# Patient Record
Sex: FEMALE | Race: WHITE | NOT HISPANIC OR LATINO | Employment: UNEMPLOYED | URBAN - METROPOLITAN AREA
[De-identification: names, ages, dates, MRNs, and addresses within clinical notes are randomized per-mention and may not be internally consistent; named-entity substitution may affect disease eponyms.]

---

## 2019-12-18 RX ORDER — MONTELUKAST SODIUM 10 MG/1
TABLET ORAL
Qty: 90 TABLET | Refills: 3 | OUTPATIENT
Start: 2019-12-18

## 2019-12-20 RX ORDER — MONTELUKAST SODIUM 10 MG/1
TABLET ORAL
Qty: 90 TABLET | Refills: 3 | Status: SHIPPED | OUTPATIENT
Start: 2019-12-20 | End: 2020-01-10 | Stop reason: SDUPTHER

## 2020-01-10 ENCOUNTER — OFFICE VISIT (OUTPATIENT)
Dept: FAMILY MEDICINE CLINIC | Facility: CLINIC | Age: 40
End: 2020-01-10

## 2020-01-10 VITALS
HEART RATE: 89 BPM | WEIGHT: 293 LBS | BODY MASS INDEX: 41.95 KG/M2 | OXYGEN SATURATION: 97 % | SYSTOLIC BLOOD PRESSURE: 124 MMHG | DIASTOLIC BLOOD PRESSURE: 80 MMHG | TEMPERATURE: 97 F | HEIGHT: 70 IN

## 2020-01-10 DIAGNOSIS — Z00.00 HEALTHCARE MAINTENANCE: Primary | ICD-10-CM

## 2020-01-10 DIAGNOSIS — M25.50 ARTHRALGIA, UNSPECIFIED JOINT: ICD-10-CM

## 2020-01-10 DIAGNOSIS — R53.83 FATIGUE, UNSPECIFIED TYPE: ICD-10-CM

## 2020-01-10 DIAGNOSIS — J45.909 MILD ASTHMA, UNSPECIFIED WHETHER COMPLICATED, UNSPECIFIED WHETHER PERSISTENT: ICD-10-CM

## 2020-01-10 PROCEDURE — 99386 PREV VISIT NEW AGE 40-64: CPT | Performed by: FAMILY MEDICINE

## 2020-01-10 RX ORDER — ALBUTEROL SULFATE 90 UG/1
AEROSOL, METERED RESPIRATORY (INHALATION)
COMMUNITY
Start: 2019-01-28 | End: 2020-01-10 | Stop reason: SDUPTHER

## 2020-01-10 RX ORDER — MONTELUKAST SODIUM 10 MG/1
10 TABLET ORAL DAILY
Qty: 90 TABLET | Refills: 3 | Status: SHIPPED | OUTPATIENT
Start: 2020-01-10 | End: 2020-07-14 | Stop reason: SDUPTHER

## 2020-01-10 RX ORDER — CETIRIZINE HYDROCHLORIDE 10 MG/1
TABLET ORAL
COMMUNITY
Start: 2019-02-20 | End: 2020-07-14 | Stop reason: SDUPTHER

## 2020-01-10 RX ORDER — ALBUTEROL SULFATE 90 UG/1
2 AEROSOL, METERED RESPIRATORY (INHALATION) EVERY 4 HOURS PRN
Qty: 3 INHALER | Refills: 3 | Status: SHIPPED | OUTPATIENT
Start: 2020-01-10 | End: 2021-01-26

## 2020-01-10 NOTE — PROGRESS NOTES
Rudi Adams is a 39 y.o. female.     Chief Complaint   Patient presents with   • Annual Exam     physical   • Asthma       History of Present Illness       The following portions of the patient's history were reviewed and updated as appropriate: allergies, current medications, past family history, past medical history, past social history, past surgical history and problem list.    Past Medical History:   Diagnosis Date   • Abnormal mammogram    • Acute upper respiratory infection    • Anemia    • Anxiety    • Arthritis    • Asthma    • BMI 40.0-44.9, adult (CMS/[facility])    • Breast mass, right    • Cervical radiculopathy    • Cutaneous candidiasis    • Eczema    • Fatigue    • Former smoker, stopped smoking in distant past    • Increased urinary frequency    • Mass of multiple sites of left breast    • Multiple joint pain    • Myalgia    • Myositis    • Rash    • Sleep apnea    • Trochanteric bursitis of right hip    • UTI symptoms    • Vitamin D deficiency        Past Surgical History:   Procedure Laterality Date   • GASTRIC BYPASS      for morbid obesity        Family History   Problem Relation Age of Onset   • Anemia Mother    • Arthritis Mother    • Breast cancer Mother    • Osteoporosis Mother    • Alcohol abuse Father    • Hyperlipidemia Father    • Hypertension Father    • Diabetes Father    • Bipolar disorder Sister    • Seizures Sister    • Alcohol abuse Maternal Grandmother    • Arthritis Maternal Grandmother    • Osteoporosis Maternal Grandmother    • Diabetes Paternal Grandmother        Social History     Socioeconomic History   • Marital status:      Spouse name: Not on file   • Number of children: Not on file   • Years of education: Not on file   • Highest education level: Not on file   Tobacco Use   • Smoking status: Former Smoker   • Smokeless tobacco: Never Used   Substance and Sexual Activity   • Alcohol use: Never     Frequency: Never   • Drug use: Never   • Sexual activity:  "Yes       Current Outpatient Medications on File Prior to Visit   Medication Sig Dispense Refill   • cetirizine (ZYRTEC ALLERGY) 10 MG tablet      • [DISCONTINUED] albuterol sulfate HFA (PROAIR HFA) 108 (90 Base) MCG/ACT inhaler      • [DISCONTINUED] montelukast (SINGULAIR) 10 MG tablet TAKE 1 TABLET BY MOUTH EVERY DAY 90 tablet 3     No current facility-administered medications on file prior to visit.        Review of Systems   Respiratory: Negative.    Cardiovascular: Negative.    Endocrine: Negative.    Genitourinary: Positive for urinary incontinence.   Musculoskeletal: Positive for arthralgias.   Allergic/Immunologic: Negative.    Neurological: Negative.    Hematological: Negative.    Psychiatric/Behavioral: Negative.        No results found for this or any previous visit (from the past 4704 hour(s)).  Objective   Vitals:    01/10/20 0906   BP: 124/80   Pulse: 89   Temp: 97 °F (36.1 °C)   SpO2: 97%   Weight: 135 kg (297 lb)   Height: 177.8 cm (70\")     Body mass index is 42.62 kg/m².  Physical Exam      Assessment/Plan   Aroldo was seen today for annual exam and asthma.    Diagnoses and all orders for this visit:    Healthcare maintenance  -     Tdap (ADACEL) 5-2-15.5 LF-MCG/0.5 injection; Inject 0.5 mL into the appropriate muscle as directed by prescriber 1 (One) Time for 1 dose.  -     Comprehensive Metabolic Panel  -     Lipid Panel  -     CBC & Differential    Mild asthma, unspecified whether complicated, unspecified whether persistent  -     montelukast (SINGULAIR) 10 MG tablet; Take 1 tablet by mouth Daily.  -     albuterol sulfate HFA (PROAIR HFA) 108 (90 Base) MCG/ACT inhaler; Inhale 2 puffs Every 4 (Four) Hours As Needed for Wheezing or Shortness of Air.    Fatigue, unspecified type  -     TSH Rfx On Abnormal To Free T4    BMI 40.0-44.9, adult (CMS/HCC)    Arthralgia, unspecified joint  -     KIKE With / DsDNA, RNP, Sjogrens A / B, Smith  -     CK  -     C-reactive Protein  -     HLA-B27 Antigen  -    "  Rheumatoid Factor  -     Sedimentation Rate  -     Uric Acid      .Return in about 1 year (around 1/10/2021) for Annual physical.

## 2020-01-15 LAB
ALBUMIN SERPL-MCNC: 4.3 G/DL (ref 3.5–5.5)
ALBUMIN/GLOB SERPL: 1.5 {RATIO} (ref 1.2–2.2)
ALP SERPL-CCNC: 53 IU/L (ref 39–117)
ALT SERPL-CCNC: 11 IU/L (ref 0–32)
ANA SER QL: NEGATIVE
AST SERPL-CCNC: 15 IU/L (ref 0–40)
BASOPHILS # BLD AUTO: 0 X10E3/UL (ref 0–0.2)
BASOPHILS NFR BLD AUTO: 1 %
BILIRUB SERPL-MCNC: 0.3 MG/DL (ref 0–1.2)
BUN SERPL-MCNC: 11 MG/DL (ref 6–24)
BUN/CREAT SERPL: 14 (ref 9–23)
CALCIUM SERPL-MCNC: 9.2 MG/DL (ref 8.7–10.2)
CHLORIDE SERPL-SCNC: 103 MMOL/L (ref 96–106)
CHOLEST SERPL-MCNC: 190 MG/DL (ref 100–199)
CK SERPL-CCNC: 145 U/L (ref 24–173)
CO2 SERPL-SCNC: 22 MMOL/L (ref 20–29)
CREAT SERPL-MCNC: 0.81 MG/DL (ref 0.57–1)
CRP SERPL-MCNC: 2 MG/L (ref 0–10)
EOSINOPHIL # BLD AUTO: 0.2 X10E3/UL (ref 0–0.4)
EOSINOPHIL NFR BLD AUTO: 3 %
ERYTHROCYTE [DISTWIDTH] IN BLOOD BY AUTOMATED COUNT: 14.6 % (ref 11.7–15.4)
ERYTHROCYTE [SEDIMENTATION RATE] IN BLOOD BY WESTERGREN METHOD: 16 MM/HR (ref 0–32)
GLOBULIN SER CALC-MCNC: 2.9 G/DL (ref 1.5–4.5)
GLUCOSE SERPL-MCNC: 90 MG/DL (ref 65–99)
HCT VFR BLD AUTO: 38.1 % (ref 34–46.6)
HDLC SERPL-MCNC: 62 MG/DL
HGB BLD-MCNC: 11.9 G/DL (ref 11.1–15.9)
HLA-B27 QL NAA+PROBE: NEGATIVE
IMM GRANULOCYTES # BLD AUTO: 0 X10E3/UL (ref 0–0.1)
IMM GRANULOCYTES NFR BLD AUTO: 0 %
LDLC SERPL CALC-MCNC: 112 MG/DL (ref 0–99)
LYMPHOCYTES # BLD AUTO: 2 X10E3/UL (ref 0.7–3.1)
LYMPHOCYTES NFR BLD AUTO: 30 %
MCH RBC QN AUTO: 25.8 PG (ref 26.6–33)
MCHC RBC AUTO-ENTMCNC: 31.2 G/DL (ref 31.5–35.7)
MCV RBC AUTO: 83 FL (ref 79–97)
MONOCYTES # BLD AUTO: 0.4 X10E3/UL (ref 0.1–0.9)
MONOCYTES NFR BLD AUTO: 6 %
NEUTROPHILS # BLD AUTO: 4 X10E3/UL (ref 1.4–7)
NEUTROPHILS NFR BLD AUTO: 60 %
PLATELET # BLD AUTO: 411 X10E3/UL (ref 150–450)
POTASSIUM SERPL-SCNC: 5 MMOL/L (ref 3.5–5.2)
PROT SERPL-MCNC: 7.2 G/DL (ref 6–8.5)
RBC # BLD AUTO: 4.62 X10E6/UL (ref 3.77–5.28)
RHEUMATOID FACT SERPL-ACNC: 10.4 IU/ML (ref 0–13.9)
SODIUM SERPL-SCNC: 142 MMOL/L (ref 134–144)
TRIGL SERPL-MCNC: 78 MG/DL (ref 0–149)
TSH SERPL DL<=0.005 MIU/L-ACNC: 2.35 UIU/ML (ref 0.45–4.5)
URATE SERPL-MCNC: 3.8 MG/DL (ref 2.5–7.1)
VLDLC SERPL CALC-MCNC: 16 MG/DL (ref 5–40)
WBC # BLD AUTO: 6.7 X10E3/UL (ref 3.4–10.8)

## 2020-01-24 ENCOUNTER — OFFICE VISIT (OUTPATIENT)
Dept: RETAIL CLINIC | Facility: CLINIC | Age: 40
End: 2020-01-24

## 2020-01-24 VITALS
SYSTOLIC BLOOD PRESSURE: 120 MMHG | OXYGEN SATURATION: 94 % | TEMPERATURE: 98.5 F | HEART RATE: 99 BPM | DIASTOLIC BLOOD PRESSURE: 85 MMHG

## 2020-01-24 DIAGNOSIS — J40 BRONCHITIS: Primary | ICD-10-CM

## 2020-01-24 PROCEDURE — 99213 OFFICE O/P EST LOW 20 MIN: CPT | Performed by: NURSE PRACTITIONER

## 2020-01-24 RX ORDER — METHYLPREDNISOLONE 4 MG/1
TABLET ORAL
Qty: 21 TABLET | Refills: 0 | Status: SHIPPED | OUTPATIENT
Start: 2020-01-24 | End: 2020-07-14 | Stop reason: SDUPTHER

## 2020-01-24 RX ORDER — AZITHROMYCIN 250 MG/1
TABLET, FILM COATED ORAL
Qty: 6 TABLET | Refills: 0 | Status: SHIPPED | OUTPATIENT
Start: 2020-01-24 | End: 2020-07-14 | Stop reason: SDUPTHER

## 2020-01-24 NOTE — PROGRESS NOTES
Subjective   Aroldo Adams is a 40 y.o. female.     Cough   This is a new problem. The current episode started in the past 7 days (4 days). The problem has been gradually worsening. The problem occurs every few minutes. Cough characteristics: intermittently productive of yellow sputum. Associated symptoms include postnasal drip, a sore throat, shortness of breath and wheezing (mild; intermittent). Pertinent negatives include no chills, fever, myalgias or rhinorrhea. She has tried steroid inhaler (sudafed) for the symptoms. Her past medical history is significant for asthma and bronchitis. There is no history of COPD, environmental allergies or pneumonia.        The following portions of the patient's history were reviewed and updated as appropriate: allergies, current medications, past family history, past medical history, past social history, past surgical history and problem list.    Review of Systems   Constitutional: Positive for fatigue. Negative for chills and fever.   HENT: Positive for postnasal drip and sore throat. Negative for congestion, rhinorrhea, sinus pressure and sneezing.    Respiratory: Positive for cough, chest tightness, shortness of breath and wheezing (mild; intermittent).    Cardiovascular: Negative.    Gastrointestinal: Negative.    Musculoskeletal: Negative for myalgias.   Allergic/Immunologic: Negative for environmental allergies.       Objective   Physical Exam   Constitutional: She is oriented to person, place, and time. She appears well-developed.   HENT:   Head: Normocephalic.   Right Ear: Ear canal normal. A middle ear effusion is present.   Left Ear: Ear canal normal. A middle ear effusion is present.   Nose: Nose normal.   Mouth/Throat: Uvula is midline, oropharynx is clear and moist and mucous membranes are normal. No tonsillar exudate.   Cardiovascular: Normal rate, regular rhythm and normal heart sounds.   Pulmonary/Chest: Effort normal. She has wheezes in the left upper  field. She has rhonchi in the right middle field.   Lymphadenopathy:     She has no cervical adenopathy.   Neurological: She is alert and oriented to person, place, and time.   Skin: Skin is warm, dry and intact.   Psychiatric: She has a normal mood and affect.     Vitals:    01/24/20 1738   BP: 120/85   BP Location: Left arm   Patient Position: Sitting   Cuff Size: Adult   Pulse: 99   Temp: 98.5 °F (36.9 °C)   TempSrc: Oral   SpO2: 94%         Assessment/Plan   Aroldo was seen today for cough and uri.    Diagnoses and all orders for this visit:    Bronchitis  -     azithromycin (ZITHROMAX Z-JAZZY) 250 MG tablet; Take 2 tablets the first day, then 1 tablet daily for 4 days.  -     methylPREDNISolone (MEDROL, JAZZY,) 4 MG tablet; Take as directed on package instructions.    Pt to follow up with PCP if symptoms worsen or fail to improve as anticipated.      Acute Bronchitis, Adult    Acute bronchitis is sudden (acute) swelling of the air tubes (bronchi) in the lungs. Acute bronchitis causes these tubes to fill with mucus, which can make it hard to breathe. It can also cause coughing or wheezing.  In adults, acute bronchitis usually goes away within 2 weeks. A cough caused by bronchitis may last up to 3 weeks. Smoking, allergies, and asthma can make the condition worse. Repeated episodes of bronchitis may cause further lung problems, such as chronic obstructive pulmonary disease (COPD).  What are the causes?  This condition can be caused by germs and by substances that irritate the lungs, including:  · Cold and flu viruses. This condition is most often caused by the same virus that causes a cold.  · Bacteria.  · Exposure to tobacco smoke, dust, fumes, and air pollution.  What increases the risk?  This condition is more likely to develop in people who:  · Have close contact with someone with acute bronchitis.  · Are exposed to lung irritants, such as tobacco smoke, dust, fumes, and vapors.  · Have a weak immune  system.  · Have a respiratory condition such as asthma.  What are the signs or symptoms?  Symptoms of this condition include:  · A cough.  · Coughing up clear, yellow, or green mucus.  · Wheezing.  · Chest congestion.  · Shortness of breath.  · A fever.  · Body aches.  · Chills.  · A sore throat.  How is this diagnosed?  This condition is usually diagnosed with a physical exam. During the exam, your health care provider may order tests, such as chest X-rays, to rule out other conditions. He or she may also:  · Test a sample of your mucus for bacterial infection.  · Check the level of oxygen in your blood. This is done to check for pneumonia.  · Do a chest X-ray or lung function testing to rule out pneumonia and other conditions.  · Perform blood tests.  Your health care provider will also ask about your symptoms and medical history.  How is this treated?  Most cases of acute bronchitis clear up over time without treatment. Your health care provider may recommend:  · Drinking more fluids. Drinking more makes your mucus thinner, which may make it easier to breathe.  · Taking a medicine for a fever or cough.  · Taking an antibiotic medicine.  · Using an inhaler to help improve shortness of breath and to control a cough.  · Using a cool mist vaporizer or humidifier to make it easier to breathe.  Follow these instructions at home:  Medicines  · Take over-the-counter and prescription medicines only as told by your health care provider.  · If you were prescribed an antibiotic, take it as told by your health care provider. Do not stop taking the antibiotic even if you start to feel better.  General instructions    · Get plenty of rest.  · Drink enough fluids to keep your urine pale yellow.  · Avoid smoking and secondhand smoke. Exposure to cigarette smoke or irritating chemicals will make bronchitis worse. If you smoke and you need help quitting, ask your health care provider. Quitting smoking will help your lungs heal  faster.  · Use an inhaler, cool mist vaporizer, or humidifier as told by your health care provider.  · Keep all follow-up visits as told by your health care provider. This is important.  How is this prevented?  To lower your risk of getting this condition again:  · Wash your hands often with soap and water. If soap and water are not available, use hand .  · Avoid contact with people who have cold symptoms.  · Try not to touch your hands to your mouth, nose, or eyes.  · Make sure to get the flu shot every year.  Contact a health care provider if:  · Your symptoms do not improve in 2 weeks of treatment.  Get help right away if:  · You cough up blood.  · You have chest pain.  · You have severe shortness of breath.  · You become dehydrated.  · You faint or keep feeling like you are going to faint.  · You keep vomiting.  · You have a severe headache.  · Your fever or chills gets worse.  This information is not intended to replace advice given to you by your health care provider. Make sure you discuss any questions you have with your health care provider.  Document Released: 01/25/2006 Document Revised: 08/01/2018 Document Reviewed: 06/07/2017  [employer] Interactive Patient Education © 2019 [employer]

## 2020-07-14 ENCOUNTER — OFFICE VISIT (OUTPATIENT)
Dept: FAMILY MEDICINE CLINIC | Facility: CLINIC | Age: 40
End: 2020-07-14

## 2020-07-14 DIAGNOSIS — J45.909 MILD ASTHMA, UNSPECIFIED WHETHER COMPLICATED, UNSPECIFIED WHETHER PERSISTENT: ICD-10-CM

## 2020-07-14 DIAGNOSIS — J40 BRONCHITIS: ICD-10-CM

## 2020-07-14 DIAGNOSIS — Z20.822 CLOSE EXPOSURE TO COVID-19 VIRUS: Primary | ICD-10-CM

## 2020-07-14 PROCEDURE — 99441 PR PHYS/QHP TELEPHONE EVALUATION 5-10 MIN: CPT | Performed by: FAMILY MEDICINE

## 2020-07-14 RX ORDER — CETIRIZINE HYDROCHLORIDE 10 MG/1
10 TABLET ORAL DAILY
Qty: 30 TABLET | Refills: 11 | Status: SHIPPED | OUTPATIENT
Start: 2020-07-14 | End: 2022-12-15

## 2020-07-14 RX ORDER — AZITHROMYCIN 250 MG/1
TABLET, FILM COATED ORAL
Qty: 6 TABLET | Refills: 0 | Status: SHIPPED | OUTPATIENT
Start: 2020-07-14 | End: 2021-01-29

## 2020-07-14 RX ORDER — METHYLPREDNISOLONE 4 MG/1
TABLET ORAL
Qty: 21 TABLET | Refills: 0 | Status: SHIPPED | OUTPATIENT
Start: 2020-07-14 | End: 2021-01-29

## 2020-07-14 RX ORDER — MONTELUKAST SODIUM 10 MG/1
10 TABLET ORAL DAILY
Qty: 90 TABLET | Refills: 3 | Status: SHIPPED | OUTPATIENT
Start: 2020-07-14 | End: 2021-01-29 | Stop reason: SDUPTHER

## 2020-07-14 NOTE — PROGRESS NOTES
Rudi Adams is a 40 y.o. female.     Consent given    Telephone visit    Time 10 min    Cc: exposed to Covid 19    History of Present Illness     Pt was exposed to Covid but rapid test was negative, she is waiting for PCR test now. Has some cough and wheezing but no fever.    The following portions of the patient's history were reviewed and updated as appropriate: allergies, current medications, past family history, past medical history, past social history, past surgical history and problem list.    Past Medical History:   Diagnosis Date   • Abnormal mammogram    • Acute upper respiratory infection    • Anemia    • Anxiety    • Arthritis    • Asthma    • BMI 40.0-44.9, adult (CMS/[facility])    • Breast mass, right    • Cervical radiculopathy    • Cutaneous candidiasis    • Eczema    • Fatigue    • Former smoker, stopped smoking in distant past    • Increased urinary frequency    • Mass of multiple sites of left breast    • Multiple joint pain    • Myalgia    • Myositis    • Rash    • Sleep apnea    • Trochanteric bursitis of right hip    • UTI symptoms    • Vitamin D deficiency        Past Surgical History:   Procedure Laterality Date   • GASTRIC BYPASS      for morbid obesity        Family History   Problem Relation Age of Onset   • Anemia Mother    • Arthritis Mother    • Breast cancer Mother    • Osteoporosis Mother    • Alcohol abuse Father    • Hyperlipidemia Father    • Hypertension Father    • Diabetes Father    • Bipolar disorder Sister    • Seizures Sister    • Alcohol abuse Maternal Grandmother    • Arthritis Maternal Grandmother    • Osteoporosis Maternal Grandmother    • Diabetes Paternal Grandmother        Social History     Socioeconomic History   • Marital status:      Spouse name: Not on file   • Number of children: Not on file   • Years of education: Not on file   • Highest education level: Not on file   Tobacco Use   • Smoking status: Former Smoker   • Smokeless tobacco: Never  Used   Substance and Sexual Activity   • Alcohol use: Never     Frequency: Never   • Drug use: Never   • Sexual activity: Yes       Current Outpatient Medications on File Prior to Visit   Medication Sig Dispense Refill   • albuterol sulfate HFA (PROAIR HFA) 108 (90 Base) MCG/ACT inhaler Inhale 2 puffs Every 4 (Four) Hours As Needed for Wheezing or Shortness of Air. 3 inhaler 3   • [DISCONTINUED] azithromycin (ZITHROMAX Z-JAZZY) 250 MG tablet Take 2 tablets the first day, then 1 tablet daily for 4 days. 6 tablet 0   • [DISCONTINUED] cetirizine (ZYRTEC ALLERGY) 10 MG tablet      • [DISCONTINUED] methylPREDNISolone (MEDROL, JAZZY,) 4 MG tablet Take as directed on package instructions. 21 tablet 0   • [DISCONTINUED] montelukast (SINGULAIR) 10 MG tablet Take 1 tablet by mouth Daily. 90 tablet 3     No current facility-administered medications on file prior to visit.        Review of Systems   Constitutional: Positive for fatigue. Negative for fever.   Respiratory: Positive for cough and wheezing.        Recent Results (from the past 4704 hour(s))   Comprehensive Metabolic Panel    Collection Time: 01/10/20  9:43 AM   Result Value Ref Range    Glucose 90 65 - 99 mg/dL    BUN 11 6 - 24 mg/dL    Creatinine 0.81 0.57 - 1.00 mg/dL    eGFR Non African Am 90 >59 mL/min/1.73    eGFR African Am 104 >59 mL/min/1.73    BUN/Creatinine Ratio 14 9 - 23    Sodium 142 134 - 144 mmol/L    Potassium 5.0 3.5 - 5.2 mmol/L    Chloride 103 96 - 106 mmol/L    Total CO2 22 20 - 29 mmol/L    Calcium 9.2 8.7 - 10.2 mg/dL    Total Protein 7.2 6.0 - 8.5 g/dL    Albumin 4.3 3.5 - 5.5 g/dL    Globulin 2.9 1.5 - 4.5 g/dL    A/G Ratio 1.5 1.2 - 2.2    Total Bilirubin 0.3 0.0 - 1.2 mg/dL    Alkaline Phosphatase 53 39 - 117 IU/L    AST (SGOT) 15 0 - 40 IU/L    ALT (SGPT) 11 0 - 32 IU/L   Lipid Panel    Collection Time: 01/10/20  9:43 AM   Result Value Ref Range    Total Cholesterol 190 100 - 199 mg/dL    Triglycerides 78 0 - 149 mg/dL    HDL Cholesterol 62  >39 mg/dL    VLDL Cholesterol 16 5 - 40 mg/dL    LDL Cholesterol  112 (H) 0 - 99 mg/dL   CBC & Differential    Collection Time: 01/10/20  9:43 AM   Result Value Ref Range    WBC 6.7 3.4 - 10.8 x10E3/uL    RBC 4.62 3.77 - 5.28 x10E6/uL    Hemoglobin 11.9 11.1 - 15.9 g/dL    Hematocrit 38.1 34.0 - 46.6 %    MCV 83 79 - 97 fL    MCH 25.8 (L) 26.6 - 33.0 pg    MCHC 31.2 (L) 31.5 - 35.7 g/dL    RDW 14.6 11.7 - 15.4 %    Platelets 411 150 - 450 x10E3/uL    Neutrophil Rel % 60 Not Estab. %    Lymphocyte Rel % 30 Not Estab. %    Monocyte Rel % 6 Not Estab. %    Eosinophil Rel % 3 Not Estab. %    Basophil Rel % 1 Not Estab. %    Neutrophils Absolute 4.0 1.4 - 7.0 x10E3/uL    Lymphocytes Absolute 2.0 0.7 - 3.1 x10E3/uL    Monocytes Absolute 0.4 0.1 - 0.9 x10E3/uL    Eosinophils Absolute 0.2 0.0 - 0.4 x10E3/uL    Basophils Absolute 0.0 0.0 - 0.2 x10E3/uL    Immature Granulocyte Rel % 0 Not Estab. %    Immature Grans Absolute 0.0 0.0 - 0.1 x10E3/uL   TSH Rfx On Abnormal To Free T4    Collection Time: 01/10/20  9:43 AM   Result Value Ref Range    TSH 2.350 0.450 - 4.500 uIU/mL   KIKE With / DsDNA, RNP, Sjogrens A / B, Green    Collection Time: 01/10/20  9:43 AM   Result Value Ref Range    KIKE Direct Negative Negative   CK    Collection Time: 01/10/20  9:43 AM   Result Value Ref Range    Creatine Kinase 145 24 - 173 U/L   C-reactive Protein    Collection Time: 01/10/20  9:43 AM   Result Value Ref Range    C-Reactive Protein 2 0 - 10 mg/L   HLA-B27 Antigen    Collection Time: 01/10/20  9:43 AM   Result Value Ref Range    HLA B27 Negative    Rheumatoid Factor    Collection Time: 01/10/20  9:43 AM   Result Value Ref Range    RA Latex Turbid 10.4 0.0 - 13.9 IU/mL   Sedimentation Rate    Collection Time: 01/10/20  9:43 AM   Result Value Ref Range    Sed Rate 16 0 - 32 mm/hr   Uric Acid    Collection Time: 01/10/20  9:43 AM   Result Value Ref Range    Uric Acid 3.8 2.5 - 7.1 mg/dL     Objective   There were no vitals filed for this  visit.  There is no height or weight on file to calculate BMI.  Physical Exam      Assessment/Plan   Diagnoses and all orders for this visit:    Close exposure to COVID-19 virus    Bronchitis  -     azithromycin (Zithromax Z-Jazzy) 250 MG tablet; Take 2 tablets the first day, then 1 tablet daily for 4 days.  -     methylPREDNISolone (MEDROL, JAZZY,) 4 MG tablet; Take as directed on package instructions.    Mild asthma, unspecified whether complicated, unspecified whether persistent  -     montelukast (SINGULAIR) 10 MG tablet; Take 1 tablet by mouth Daily.  -     cetirizine (ZyrTEC Allergy) 10 MG tablet; Take 1 tablet by mouth Daily.      Follow up prn.

## 2020-07-21 ENCOUNTER — TELEMEDICINE (OUTPATIENT)
Dept: FAMILY MEDICINE CLINIC | Facility: CLINIC | Age: 40
End: 2020-07-21

## 2020-07-21 ENCOUNTER — TELEPHONE (OUTPATIENT)
Dept: FAMILY MEDICINE CLINIC | Facility: CLINIC | Age: 40
End: 2020-07-21

## 2020-07-21 DIAGNOSIS — U07.1 COVID-19 VIRUS DETECTED: Primary | ICD-10-CM

## 2020-07-21 PROCEDURE — 99213 OFFICE O/P EST LOW 20 MIN: CPT | Performed by: FAMILY MEDICINE

## 2020-07-21 NOTE — TELEPHONE ENCOUNTER
Patient tested positive for covid on 07/13.  She said Dr Peña mentioned to call and let her know she is doing.  She says she is feeling fine and is having no symptoms now.  She also wants to know how long does she need to quarantine.  She said she heard the CDC had changed guidelines so she wasn't sure.  Her number is [phone].

## 2020-07-21 NOTE — PROGRESS NOTES
Rudi Adams is a 40 y.o. female.     Consent given    Visit via Doximity    Time 15 min    CC: follow up on Covid 19, doing better, no fever,    History of Present Illness    follow up on Covid 19, doing better, no fever,    The following portions of the patient's history were reviewed and updated as appropriate: allergies, current medications, past family history, past medical history, past social history, past surgical history and problem list.    Past Medical History:   Diagnosis Date   • Abnormal mammogram    • Acute upper respiratory infection    • Anemia    • Anxiety    • Arthritis    • Asthma    • BMI 40.0-44.9, adult (CMS/[facility])    • Breast mass, right    • Cervical radiculopathy    • Cutaneous candidiasis    • Eczema    • Fatigue    • Former smoker, stopped smoking in distant past    • Increased urinary frequency    • Mass of multiple sites of left breast    • Multiple joint pain    • Myalgia    • Myositis    • Rash    • Sleep apnea    • Trochanteric bursitis of right hip    • UTI symptoms    • Vitamin D deficiency        Past Surgical History:   Procedure Laterality Date   • GASTRIC BYPASS      for morbid obesity        Family History   Problem Relation Age of Onset   • Anemia Mother    • Arthritis Mother    • Breast cancer Mother    • Osteoporosis Mother    • Alcohol abuse Father    • Hyperlipidemia Father    • Hypertension Father    • Diabetes Father    • Bipolar disorder Sister    • Seizures Sister    • Alcohol abuse Maternal Grandmother    • Arthritis Maternal Grandmother    • Osteoporosis Maternal Grandmother    • Diabetes Paternal Grandmother        Social History     Socioeconomic History   • Marital status:      Spouse name: Not on file   • Number of children: Not on file   • Years of education: Not on file   • Highest education level: Not on file   Tobacco Use   • Smoking status: Former Smoker   • Smokeless tobacco: Never Used   Substance and Sexual Activity   • Alcohol  use: Never     Frequency: Never   • Drug use: Never   • Sexual activity: Yes       Current Outpatient Medications on File Prior to Visit   Medication Sig Dispense Refill   • albuterol sulfate HFA (PROAIR HFA) 108 (90 Base) MCG/ACT inhaler Inhale 2 puffs Every 4 (Four) Hours As Needed for Wheezing or Shortness of Air. 3 inhaler 3   • azithromycin (Zithromax Z-Jazzy) 250 MG tablet Take 2 tablets the first day, then 1 tablet daily for 4 days. 6 tablet 0   • cetirizine (ZyrTEC Allergy) 10 MG tablet Take 1 tablet by mouth Daily. 30 tablet 11   • methylPREDNISolone (MEDROL, JAZZY,) 4 MG tablet Take as directed on package instructions. 21 tablet 0   • montelukast (SINGULAIR) 10 MG tablet Take 1 tablet by mouth Daily. 90 tablet 3     No current facility-administered medications on file prior to visit.        Review of Systems   Constitutional: Negative.        Recent Results (from the past 4704 hour(s))   Comprehensive Metabolic Panel    Collection Time: 01/10/20  9:43 AM   Result Value Ref Range    Glucose 90 65 - 99 mg/dL    BUN 11 6 - 24 mg/dL    Creatinine 0.81 0.57 - 1.00 mg/dL    eGFR Non African Am 90 >59 mL/min/1.73    eGFR African Am 104 >59 mL/min/1.73    BUN/Creatinine Ratio 14 9 - 23    Sodium 142 134 - 144 mmol/L    Potassium 5.0 3.5 - 5.2 mmol/L    Chloride 103 96 - 106 mmol/L    Total CO2 22 20 - 29 mmol/L    Calcium 9.2 8.7 - 10.2 mg/dL    Total Protein 7.2 6.0 - 8.5 g/dL    Albumin 4.3 3.5 - 5.5 g/dL    Globulin 2.9 1.5 - 4.5 g/dL    A/G Ratio 1.5 1.2 - 2.2    Total Bilirubin 0.3 0.0 - 1.2 mg/dL    Alkaline Phosphatase 53 39 - 117 IU/L    AST (SGOT) 15 0 - 40 IU/L    ALT (SGPT) 11 0 - 32 IU/L   Lipid Panel    Collection Time: 01/10/20  9:43 AM   Result Value Ref Range    Total Cholesterol 190 100 - 199 mg/dL    Triglycerides 78 0 - 149 mg/dL    HDL Cholesterol 62 >39 mg/dL    VLDL Cholesterol 16 5 - 40 mg/dL    LDL Cholesterol  112 (H) 0 - 99 mg/dL   CBC & Differential    Collection Time: 01/10/20  9:43 AM    Result Value Ref Range    WBC 6.7 3.4 - 10.8 x10E3/uL    RBC 4.62 3.77 - 5.28 x10E6/uL    Hemoglobin 11.9 11.1 - 15.9 g/dL    Hematocrit 38.1 34.0 - 46.6 %    MCV 83 79 - 97 fL    MCH 25.8 (L) 26.6 - 33.0 pg    MCHC 31.2 (L) 31.5 - 35.7 g/dL    RDW 14.6 11.7 - 15.4 %    Platelets 411 150 - 450 x10E3/uL    Neutrophil Rel % 60 Not Estab. %    Lymphocyte Rel % 30 Not Estab. %    Monocyte Rel % 6 Not Estab. %    Eosinophil Rel % 3 Not Estab. %    Basophil Rel % 1 Not Estab. %    Neutrophils Absolute 4.0 1.4 - 7.0 x10E3/uL    Lymphocytes Absolute 2.0 0.7 - 3.1 x10E3/uL    Monocytes Absolute 0.4 0.1 - 0.9 x10E3/uL    Eosinophils Absolute 0.2 0.0 - 0.4 x10E3/uL    Basophils Absolute 0.0 0.0 - 0.2 x10E3/uL    Immature Granulocyte Rel % 0 Not Estab. %    Immature Grans Absolute 0.0 0.0 - 0.1 x10E3/uL   TSH Rfx On Abnormal To Free T4    Collection Time: 01/10/20  9:43 AM   Result Value Ref Range    TSH 2.350 0.450 - 4.500 uIU/mL   KIKE With / DsDNA, RNP, Sjogrens A / B, Green    Collection Time: 01/10/20  9:43 AM   Result Value Ref Range    KIKE Direct Negative Negative   CK    Collection Time: 01/10/20  9:43 AM   Result Value Ref Range    Creatine Kinase 145 24 - 173 U/L   C-reactive Protein    Collection Time: 01/10/20  9:43 AM   Result Value Ref Range    C-Reactive Protein 2 0 - 10 mg/L   HLA-B27 Antigen    Collection Time: 01/10/20  9:43 AM   Result Value Ref Range    HLA B27 Negative    Rheumatoid Factor    Collection Time: 01/10/20  9:43 AM   Result Value Ref Range    RA Latex Turbid 10.4 0.0 - 13.9 IU/mL   Sedimentation Rate    Collection Time: 01/10/20  9:43 AM   Result Value Ref Range    Sed Rate 16 0 - 32 mm/hr   Uric Acid    Collection Time: 01/10/20  9:43 AM   Result Value Ref Range    Uric Acid 3.8 2.5 - 7.1 mg/dL     Objective   There were no vitals filed for this visit.  There is no height or weight on file to calculate BMI.  Physical Exam   Constitutional: She appears well-developed and well-nourished. No  distress.   Skin: She is not diaphoretic.   Vitals reviewed.        Assessment/Plan   Diagnoses and all orders for this visit:    COVID-19 virus detected    Recovering, 10 days quarantine will be done on 7/24/20.  Pt needs a release to work note.    Return if symptoms worsen or fail to improve.

## 2021-01-26 DIAGNOSIS — J45.909 MILD ASTHMA, UNSPECIFIED WHETHER COMPLICATED, UNSPECIFIED WHETHER PERSISTENT: ICD-10-CM

## 2021-01-26 RX ORDER — ALBUTEROL SULFATE 90 UG/1
2 AEROSOL, METERED RESPIRATORY (INHALATION) EVERY 4 HOURS PRN
Qty: 18 G | Refills: 11 | Status: SHIPPED | OUTPATIENT
Start: 2021-01-26 | End: 2021-01-29 | Stop reason: SDUPTHER

## 2021-01-29 ENCOUNTER — OFFICE VISIT (OUTPATIENT)
Dept: FAMILY MEDICINE CLINIC | Facility: CLINIC | Age: 41
End: 2021-01-29

## 2021-01-29 VITALS
HEART RATE: 79 BPM | DIASTOLIC BLOOD PRESSURE: 77 MMHG | OXYGEN SATURATION: 98 % | TEMPERATURE: 97.8 F | WEIGHT: 276 LBS | BODY MASS INDEX: 39.51 KG/M2 | HEIGHT: 70 IN | SYSTOLIC BLOOD PRESSURE: 123 MMHG

## 2021-01-29 DIAGNOSIS — J45.909 MILD ASTHMA, UNSPECIFIED WHETHER COMPLICATED, UNSPECIFIED WHETHER PERSISTENT: ICD-10-CM

## 2021-01-29 DIAGNOSIS — Z00.00 HEALTHCARE MAINTENANCE: Primary | ICD-10-CM

## 2021-01-29 DIAGNOSIS — Z23 IMMUNIZATION DUE: ICD-10-CM

## 2021-01-29 DIAGNOSIS — R53.82 CHRONIC FATIGUE: ICD-10-CM

## 2021-01-29 PROBLEM — Z91.89 AT RISK FOR BREAST CANCER: Status: ACTIVE | Noted: 2021-01-29

## 2021-01-29 PROCEDURE — 99396 PREV VISIT EST AGE 40-64: CPT | Performed by: FAMILY MEDICINE

## 2021-01-29 PROCEDURE — 90471 IMMUNIZATION ADMIN: CPT | Performed by: FAMILY MEDICINE

## 2021-01-29 PROCEDURE — 90732 PPSV23 VACC 2 YRS+ SUBQ/IM: CPT | Performed by: FAMILY MEDICINE

## 2021-01-29 RX ORDER — MONTELUKAST SODIUM 10 MG/1
10 TABLET ORAL DAILY
Qty: 90 TABLET | Refills: 3 | Status: SHIPPED | OUTPATIENT
Start: 2021-01-29 | End: 2022-02-28

## 2021-01-29 RX ORDER — ALBUTEROL SULFATE 90 UG/1
2 AEROSOL, METERED RESPIRATORY (INHALATION) EVERY 4 HOURS PRN
Qty: 54 G | Refills: 11 | Status: SHIPPED | OUTPATIENT
Start: 2021-01-29 | End: 2022-05-24 | Stop reason: SDUPTHER

## 2021-01-29 NOTE — PROGRESS NOTES
Rudi Adams is a 41 y.o. female.     Chief Complaint   Patient presents with   • Annual Exam   • Gynecologic Exam       History of Present Illness       The following portions of the patient's history were reviewed and updated as appropriate: allergies, current medications, past family history, past medical history, past social history, past surgical history and problem list.    Past Medical History:   Diagnosis Date   • Abnormal mammogram    • Acute upper respiratory infection    • Anemia    • Anxiety    • Arthritis    • Asthma    • BMI 40.0-44.9, adult (CMS/[facility])    • Breast mass, right    • Cervical radiculopathy    • Cutaneous candidiasis    • Eczema    • Fatigue    • Former smoker, stopped smoking in distant past    • Increased urinary frequency    • Mass of multiple sites of left breast    • Multiple joint pain    • Myalgia    • Myositis    • Rash    • Sleep apnea    • Trochanteric bursitis of right hip    • UTI symptoms    • Vitamin D deficiency        Past Surgical History:   Procedure Laterality Date   • GASTRIC BYPASS      for morbid obesity        Family History   Problem Relation Age of Onset   • Anemia Mother    • Arthritis Mother    • Breast cancer Mother    • Osteoporosis Mother    • Alcohol abuse Father    • Hyperlipidemia Father    • Hypertension Father    • Diabetes Father    • Bipolar disorder Sister    • Seizures Sister    • Alcohol abuse Maternal Grandmother    • Arthritis Maternal Grandmother    • Osteoporosis Maternal Grandmother    • Diabetes Paternal Grandmother        Social History     Socioeconomic History   • Marital status:      Spouse name: Not on file   • Number of children: Not on file   • Years of education: Not on file   • Highest education level: Not on file   Tobacco Use   • Smoking status: Former Smoker   • Smokeless tobacco: Never Used   Substance and Sexual Activity   • Alcohol use: Never     Frequency: Never   • Drug use: Never   • Sexual activity:  "Yes       Current Outpatient Medications on File Prior to Visit   Medication Sig Dispense Refill   • cetirizine (ZyrTEC Allergy) 10 MG tablet Take 1 tablet by mouth Daily. 30 tablet 11   • [DISCONTINUED] albuterol sulfate  (90 Base) MCG/ACT inhaler INHALE 2 PUFFS EVERY 4 (FOUR) HOURS AS NEEDED FOR WHEEZING OR SHORTNESS OF AIR. 18 g 11   • [DISCONTINUED] montelukast (SINGULAIR) 10 MG tablet Take 1 tablet by mouth Daily. 90 tablet 3   • [DISCONTINUED] azithromycin (Zithromax Z-Jazzy) 250 MG tablet Take 2 tablets the first day, then 1 tablet daily for 4 days. 6 tablet 0   • [DISCONTINUED] methylPREDNISolone (MEDROL, JAZZY,) 4 MG tablet Take as directed on package instructions. 21 tablet 0     No current facility-administered medications on file prior to visit.        Review of Systems    No results found for this or any previous visit (from the past 4704 hour(s)).  Objective   Vitals:    01/29/21 0828   BP: 123/77   Pulse: 79   Temp: 97.8 °F (36.6 °C)   SpO2: 98%   Weight: 125 kg (276 lb)   Height: 177.8 cm (70\")     Body mass index is 39.6 kg/m².  Physical Exam  Vitals signs and nursing note reviewed.   Constitutional:       General: She is not in acute distress.     Appearance: She is well-developed. She is not diaphoretic.   Cardiovascular:      Rate and Rhythm: Normal rate and regular rhythm.   Pulmonary:      Effort: Pulmonary effort is normal. No respiratory distress.      Breath sounds: Normal breath sounds. No wheezing.           Diagnoses and all orders for this visit:    1. Healthcare maintenance (Primary)  -     TSH Rfx On Abnormal To Free T4  -     Comprehensive Metabolic Panel  -     Lipid Panel  -     CBC & Differential    2. Chronic fatigue  -     TSH Rfx On Abnormal To Free T4    3. Mild asthma, unspecified whether complicated, unspecified whether persistent  -     albuterol sulfate  (90 Base) MCG/ACT inhaler; Inhale 2 puffs Every 4 (Four) Hours As Needed for Wheezing or Shortness of Air.  " Dispense: 54 g; Refill: 11  -     montelukast (SINGULAIR) 10 MG tablet; Take 1 tablet by mouth Daily.  Dispense: 90 tablet; Refill: 3    4. Immunization due  -     Pneumococcal Polysaccharide Vaccine 23-Valent Greater Than or Equal To 3yo Subcutaneous / IM      Return in about 1 year (around 1/29/2022) for Annual physical.

## 2021-01-30 LAB
ALBUMIN SERPL-MCNC: 4.2 G/DL (ref 3.5–5.2)
ALBUMIN/GLOB SERPL: 1.4 G/DL
ALP SERPL-CCNC: 54 U/L (ref 39–117)
ALT SERPL-CCNC: 14 U/L (ref 1–33)
AST SERPL-CCNC: 16 U/L (ref 1–32)
BASOPHILS # BLD AUTO: 0.03 10*3/MM3 (ref 0–0.2)
BASOPHILS NFR BLD AUTO: 0.5 % (ref 0–1.5)
BILIRUB SERPL-MCNC: 0.5 MG/DL (ref 0–1.2)
BUN SERPL-MCNC: 11 MG/DL (ref 6–20)
BUN/CREAT SERPL: 15.7 (ref 7–25)
CALCIUM SERPL-MCNC: 9.1 MG/DL (ref 8.6–10.5)
CHLORIDE SERPL-SCNC: 102 MMOL/L (ref 98–107)
CHOLEST SERPL-MCNC: 196 MG/DL (ref 0–200)
CO2 SERPL-SCNC: 26.8 MMOL/L (ref 22–29)
CREAT SERPL-MCNC: 0.7 MG/DL (ref 0.57–1)
EOSINOPHIL # BLD AUTO: 0.18 10*3/MM3 (ref 0–0.4)
EOSINOPHIL NFR BLD AUTO: 3.1 % (ref 0.3–6.2)
ERYTHROCYTE [DISTWIDTH] IN BLOOD BY AUTOMATED COUNT: 13.5 % (ref 12.3–15.4)
GLOBULIN SER CALC-MCNC: 3 GM/DL
GLUCOSE SERPL-MCNC: 88 MG/DL (ref 65–99)
HCT VFR BLD AUTO: 40.5 % (ref 34–46.6)
HDLC SERPL-MCNC: 70 MG/DL (ref 40–60)
HGB BLD-MCNC: 12.8 G/DL (ref 12–15.9)
IMM GRANULOCYTES # BLD AUTO: 0.01 10*3/MM3 (ref 0–0.05)
IMM GRANULOCYTES NFR BLD AUTO: 0.2 % (ref 0–0.5)
LDLC SERPL CALC-MCNC: 108 MG/DL (ref 0–100)
LYMPHOCYTES # BLD AUTO: 1.68 10*3/MM3 (ref 0.7–3.1)
LYMPHOCYTES NFR BLD AUTO: 28.8 % (ref 19.6–45.3)
MCH RBC QN AUTO: 26.3 PG (ref 26.6–33)
MCHC RBC AUTO-ENTMCNC: 31.6 G/DL (ref 31.5–35.7)
MCV RBC AUTO: 83.2 FL (ref 79–97)
MONOCYTES # BLD AUTO: 0.37 10*3/MM3 (ref 0.1–0.9)
MONOCYTES NFR BLD AUTO: 6.3 % (ref 5–12)
NEUTROPHILS # BLD AUTO: 3.56 10*3/MM3 (ref 1.7–7)
NEUTROPHILS NFR BLD AUTO: 61.1 % (ref 42.7–76)
NRBC BLD AUTO-RTO: 0 /100 WBC (ref 0–0.2)
PLATELET # BLD AUTO: 436 10*3/MM3 (ref 140–450)
POTASSIUM SERPL-SCNC: 4.1 MMOL/L (ref 3.5–5.2)
PROT SERPL-MCNC: 7.2 G/DL (ref 6–8.5)
RBC # BLD AUTO: 4.87 10*6/MM3 (ref 3.77–5.28)
SODIUM SERPL-SCNC: 137 MMOL/L (ref 136–145)
TRIGL SERPL-MCNC: 101 MG/DL (ref 0–150)
TSH SERPL DL<=0.005 MIU/L-ACNC: 2.47 UIU/ML (ref 0.27–4.2)
VLDLC SERPL CALC-MCNC: 18 MG/DL (ref 5–40)
WBC # BLD AUTO: 5.83 10*3/MM3 (ref 3.4–10.8)

## 2021-04-06 ENCOUNTER — BULK ORDERING (OUTPATIENT)
Dept: CASE MANAGEMENT | Facility: OTHER | Age: 41
End: 2021-04-06

## 2021-04-06 DIAGNOSIS — Z23 IMMUNIZATION DUE: ICD-10-CM

## 2022-02-27 DIAGNOSIS — J45.909 MILD ASTHMA, UNSPECIFIED WHETHER COMPLICATED, UNSPECIFIED WHETHER PERSISTENT: ICD-10-CM

## 2022-02-28 RX ORDER — MONTELUKAST SODIUM 10 MG/1
TABLET ORAL
Qty: 90 TABLET | Refills: 3 | Status: SHIPPED | OUTPATIENT
Start: 2022-02-28 | End: 2023-03-02 | Stop reason: SDUPTHER

## 2022-05-24 DIAGNOSIS — J45.909 MILD ASTHMA, UNSPECIFIED WHETHER COMPLICATED, UNSPECIFIED WHETHER PERSISTENT: ICD-10-CM

## 2022-05-24 RX ORDER — ALBUTEROL SULFATE 90 UG/1
2 AEROSOL, METERED RESPIRATORY (INHALATION) EVERY 4 HOURS PRN
Qty: 54 G | Refills: 11 | Status: SHIPPED | OUTPATIENT
Start: 2022-05-24

## 2022-05-24 NOTE — TELEPHONE ENCOUNTER
Caller: Aroldo Adams    Relationship: Self    Best call back number: [phone]    Requested Prescriptions:   Requested Prescriptions     Pending Prescriptions Disp Refills   • albuterol sulfate  (90 Base) MCG/ACT inhaler 54 g 11     Sig: Inhale 2 puffs Every 4 (Four) Hours As Needed for Wheezing or Shortness of Air.        Pharmacy where request should be sent: [facility]/PHARMACY #[id number] [facility] [address] AT [facility] - [phone]  - [phone]     Additional details provided by patient: PATIENT WOULD ALSO LIKE THE ALBUTEROL SOLUTION ALSO.   PATIENT JUST TESTED AGAIN FOR COVID AND WOULD LIKE THESE CALLED IN.     Does the patient have less than a 3 day supply:  [x] Yes  [] No    Wilma Silverman Rep   05/24/22 10:19 EDT

## 2022-05-25 ENCOUNTER — TELEPHONE (OUTPATIENT)
Dept: FAMILY MEDICINE CLINIC | Facility: CLINIC | Age: 42
End: 2022-05-25

## 2022-05-25 RX ORDER — ALBUTEROL SULFATE 90 UG/1
2 AEROSOL, METERED RESPIRATORY (INHALATION) EVERY 4 HOURS PRN
Refills: 11 | OUTPATIENT
Start: 2022-05-25

## 2022-05-25 RX ORDER — ALBUTEROL SULFATE 1.25 MG/3ML
1 SOLUTION RESPIRATORY (INHALATION) EVERY 6 HOURS PRN
Qty: 60 EACH | Refills: 12 | Status: SHIPPED | OUTPATIENT
Start: 2022-05-25

## 2022-05-25 NOTE — TELEPHONE ENCOUNTER
Caller: Aroldo Adams    Relationship: Self    Best call back number: [phone]       What medication are you requesting: PAXLOVID AND ALBUTEROL FOR NEBULIZER     What are your current symptoms: COUGH, CONGESTION, FEVER, ASTHMA     How long have you been experiencing symptoms: 5/23/22    Have you had these symptoms before:    [x] Yes  [] No    Have you been treated for these symptoms before:   [x] Yes  [] No    If a prescription is needed, what is your preferred pharmacy and phone number:   [facility]/pharmacy #[id number] - [address] - [phone]  - [phone]         Additional notes: PATIENT HAS COVID AND STATES SYMPTOMS ARE GETTING WORSE.     PATIENT IS WANTING TO KNOW IF SHE CAN BE PRESCRIBED THESE MEDICATIONS.     PLEASE CALL AND ADVISE

## 2022-05-26 NOTE — TELEPHONE ENCOUNTER
Spoke to patient personally this morning.  Her symptoms started 5/23/2022 and hit that she tested positive for COVID 5 5/23/2022, she has underlying asthma we will send  Paxlovid prescription and authorization form today.

## 2022-06-03 ENCOUNTER — OFFICE VISIT (OUTPATIENT)
Dept: FAMILY MEDICINE CLINIC | Facility: CLINIC | Age: 42
End: 2022-06-03

## 2022-06-03 VITALS
OXYGEN SATURATION: 96 % | HEART RATE: 76 BPM | DIASTOLIC BLOOD PRESSURE: 83 MMHG | TEMPERATURE: 98.2 F | SYSTOLIC BLOOD PRESSURE: 125 MMHG | WEIGHT: 281.8 LBS | HEIGHT: 70 IN | BODY MASS INDEX: 40.34 KG/M2

## 2022-06-03 DIAGNOSIS — Z01.419 ENCOUNTER FOR GYNECOLOGICAL EXAMINATION WITHOUT ABNORMAL FINDING: ICD-10-CM

## 2022-06-03 DIAGNOSIS — Z12.4 PAP SMEAR FOR CERVICAL CANCER SCREENING: ICD-10-CM

## 2022-06-03 DIAGNOSIS — R73.9 HYPERGLYCEMIA: ICD-10-CM

## 2022-06-03 DIAGNOSIS — Z23 IMMUNIZATION DUE: ICD-10-CM

## 2022-06-03 DIAGNOSIS — Z00.00 HEALTHCARE MAINTENANCE: Primary | ICD-10-CM

## 2022-06-03 DIAGNOSIS — R53.82 CHRONIC FATIGUE: ICD-10-CM

## 2022-06-03 PROCEDURE — 90471 IMMUNIZATION ADMIN: CPT | Performed by: FAMILY MEDICINE

## 2022-06-03 PROCEDURE — 90677 PCV20 VACCINE IM: CPT | Performed by: FAMILY MEDICINE

## 2022-06-03 PROCEDURE — 99396 PREV VISIT EST AGE 40-64: CPT | Performed by: FAMILY MEDICINE

## 2022-06-03 NOTE — PROGRESS NOTES
"Aroldo Adams is here today for an annual physical exam.     Eating a healthy diet. Exercising routinely. yes  Regular periods. Wears seat belt. Feels safe at home.   Sexual activity:yes  Birth control:vasectomy  Pregnancy:1  Sexual and gender orientation:h/s    PHQ-2 Depression Screening  Little interest or pleasure in doing things? 0-->not at all   Feeling down, depressed, or hopeless? 0-->not at all   PHQ-2 Total Score 0         I have reviewed the patient's medical, family, and social history in detail and updated the computerized patient record.    Screening history:  Colonoscopy - n/a  Mammogram- 2022, Pap/pelvic - today  Metabolic -obese    Health Maintenance   Topic Date Due   • COVID-19 Vaccine (1) Never done   • TDAP/TD VACCINES (1 - Tdap) Never done   • Pneumococcal Vaccine 0-64 (2 - PCV) 01/29/2022   • INFLUENZA VACCINE  08/01/2022   • ANNUAL PHYSICAL  06/04/2023   • MAMMOGRAM  04/01/2024   • PAP SMEAR  06/03/2025   • HEPATITIS C SCREENING  Discontinued       Review of Systems   Constitutional: Negative.        /83 (BP Location: Right arm, Patient Position: Sitting)   Pulse 76   Temp 98.2 °F (36.8 °C)   Ht 177.8 cm (70\")   Wt 128 kg (281 lb 12.8 oz)   SpO2 96%   BMI 40.43 kg/m²      Physical Exam      Physical Exam  Vitals and nursing note reviewed.   Constitutional:       General: She is not in acute distress.     Appearance: She is well-developed. She is not diaphoretic.   Cardiovascular:      Rate and Rhythm: Normal rate and regular rhythm.   Pulmonary:      Effort: Pulmonary effort is normal. No respiratory distress.      Breath sounds: Normal breath sounds. No wheezing.   Genitourinary:     Vagina: No vaginal discharge.      Comments: Normal GYN exam  NL breast exam B      No visits with results within 2 Week(s) from this visit.   Latest known visit with results is:   Office Visit on 01/29/2021   Component Date Value Ref Range Status   • TSH 01/29/2021 2.470  0.270 - 4.200 uIU/mL " Final   • Glucose 01/29/2021 88  65 - 99 mg/dL Final   • BUN 01/29/2021 11  6 - 20 mg/dL Final   • Creatinine 01/29/2021 0.70  0.57 - 1.00 mg/dL Final   • eGFR Non  Am 01/29/2021 91  >60 mL/min/1.73 Final    Comment: GFR Normal >60  Chronic Kidney Disease <60  Kidney Failure <15     • eGFR  Am 01/29/2021 111  >60 mL/min/1.73 Final   • BUN/Creatinine Ratio 01/29/2021 15.7  7.0 - 25.0 Final   • Sodium 01/29/2021 137  136 - 145 mmol/L Final   • Potassium 01/29/2021 4.1  3.5 - 5.2 mmol/L Final   • Chloride 01/29/2021 102  98 - 107 mmol/L Final   • Total CO2 01/29/2021 26.8  22.0 - 29.0 mmol/L Final   • Calcium 01/29/2021 9.1  8.6 - 10.5 mg/dL Final   • Total Protein 01/29/2021 7.2  6.0 - 8.5 g/dL Final   • Albumin 01/29/2021 4.20  3.50 - 5.20 g/dL Final   • Globulin 01/29/2021 3.0  gm/dL Final   • A/G Ratio 01/29/2021 1.4  g/dL Final   • Total Bilirubin 01/29/2021 0.5  0.0 - 1.2 mg/dL Final   • Alkaline Phosphatase 01/29/2021 54  39 - 117 U/L Final   • AST (SGOT) 01/29/2021 16  1 - 32 U/L Final   • ALT (SGPT) 01/29/2021 14  1 - 33 U/L Final   • Total Cholesterol 01/29/2021 196  0 - 200 mg/dL Final    Comment: Cholesterol Reference Ranges  (U.S. Department of Health and Human Services ATP III  Classifications)  Desirable          <200 mg/dL  Borderline High    200-239 mg/dL  High Risk          >240 mg/dL  Triglyceride Reference Ranges  (U.S. Department of Health and Human Services ATP III  Classifications)  Normal           <150 mg/dL  Borderline High  150-199 mg/dL  High             200-499 mg/dL  Very High        >500 mg/dL  HDL Reference Ranges  (U.S. Department of Health and Human Services ATP III  Classifcations)  Low     <40 mg/dl (major risk factor for CHD)  High    >60 mg/dl ('negative' risk factor for CHD)  LDL Reference Ranges  (U.S. Department of Health and Human Services ATP III  Classifcations)  Optimal          <100 mg/dL  Near Optimal     100-129 mg/dL  Borderline High  130-159 mg/dL  High              160-189 mg/dL  Very High        >189 mg/dL     • Triglycerides 01/29/2021 101  0 - 150 mg/dL Final   • HDL Cholesterol 01/29/2021 70 (A) 40 - 60 mg/dL Final   • VLDL Cholesterol [place] 01/29/2021 18  5 - 40 mg/dL Final   • LDL Chol Calc ([facility]) 01/29/2021 108 (A) 0 - 100 mg/dL Final   • WBC 01/29/2021 5.83  3.40 - 10.80 10*3/mm3 Final   • RBC 01/29/2021 4.87  3.77 - 5.28 10*6/mm3 Final   • Hemoglobin 01/29/2021 12.8  12.0 - 15.9 g/dL Final   • Hematocrit 01/29/2021 40.5  34.0 - 46.6 % Final   • MCV 01/29/2021 83.2  79.0 - 97.0 fL Final   • MCH 01/29/2021 26.3 (A) 26.6 - 33.0 pg Final   • MCHC 01/29/2021 31.6  31.5 - 35.7 g/dL Final   • RDW 01/29/2021 13.5  12.3 - 15.4 % Final   • Platelets 01/29/2021 436  140 - 450 10*3/mm3 Final   • Neutrophil Rel % 01/29/2021 61.1  42.7 - 76.0 % Final   • Lymphocyte Rel % 01/29/2021 28.8  19.6 - 45.3 % Final   • Monocyte Rel % 01/29/2021 6.3  5.0 - 12.0 % Final   • Eosinophil Rel % 01/29/2021 3.1  0.3 - 6.2 % Final   • Basophil Rel % 01/29/2021 0.5  0.0 - 1.5 % Final   • Neutrophils Absolute 01/29/2021 3.56  1.70 - 7.00 10*3/mm3 Final   • Lymphocytes Absolute 01/29/2021 1.68  0.70 - 3.10 10*3/mm3 Final   • Monocytes Absolute 01/29/2021 0.37  0.10 - 0.90 10*3/mm3 Final   • Eosinophils Absolute 01/29/2021 0.18  0.00 - 0.40 10*3/mm3 Final   • Basophils Absolute 01/29/2021 0.03  0.00 - 0.20 10*3/mm3 Final   • Immature Granulocyte Rel % 01/29/2021 0.2  0.0 - 0.5 % Final   • Immature Grans Absolute 01/29/2021 0.01  0.00 - 0.05 10*3/mm3 Final   • nRBC 01/29/2021 0.0  0.0 - 0.2 /100 WBC Final         Current Outpatient Medications:   •  albuterol (ACCUNEB) 1.25 MG/3ML nebulizer solution, Take 3 mL by nebulization Every 6 (Six) Hours As Needed for Wheezing., Disp: 60 each, Rfl: 12  •  albuterol sulfate  (90 Base) MCG/ACT inhaler, Inhale 2 puffs Every 4 (Four) Hours As Needed for Wheezing or Shortness of Air., Disp: 54 g, Rfl: 11  •  cetirizine (ZyrTEC Allergy) 10 MG tablet, Take  1 tablet by mouth Daily., Disp: 30 tablet, Rfl: 11  •  montelukast (SINGULAIR) 10 MG tablet, TAKE 1 TABLET BY MOUTH EVERY DAY, Disp: 90 tablet, Rfl: 3    Diagnoses and all orders for this visit:    1. Healthcare maintenance (Primary)    2. Immunization due  -     Pneumococcal Conjugate Vaccine 20-Valent (PCV20)    3. Encounter for gynecological examination without abnormal finding    4. Pap smear for cervical cancer screening    5. Chronic fatigue    6. Hyperglycemia        Encourage healthy diet and exercise.  Encourage patient to stay up to date on screening examinations as indicated based on age and risk factors. F/U yearly.

## 2022-06-04 LAB
ALBUMIN SERPL-MCNC: 4.6 G/DL (ref 3.8–4.8)
ALBUMIN/GLOB SERPL: 1.4 {RATIO} (ref 1.2–2.2)
ALP SERPL-CCNC: 63 IU/L (ref 44–121)
ALT SERPL-CCNC: 11 IU/L (ref 0–32)
AST SERPL-CCNC: 15 IU/L (ref 0–40)
BASOPHILS # BLD AUTO: 0.1 X10E3/UL (ref 0–0.2)
BASOPHILS NFR BLD AUTO: 1 %
BILIRUB SERPL-MCNC: 0.5 MG/DL (ref 0–1.2)
BUN SERPL-MCNC: 13 MG/DL (ref 6–24)
BUN/CREAT SERPL: 15 (ref 9–23)
CALCIUM SERPL-MCNC: 9.9 MG/DL (ref 8.7–10.2)
CHLORIDE SERPL-SCNC: 101 MMOL/L (ref 96–106)
CHOLEST SERPL-MCNC: 193 MG/DL (ref 100–199)
CO2 SERPL-SCNC: 22 MMOL/L (ref 20–29)
CREAT SERPL-MCNC: 0.87 MG/DL (ref 0.57–1)
EGFRCR SERPLBLD CKD-EPI 2021: 84 ML/MIN/1.73
EOSINOPHIL # BLD AUTO: 0.2 X10E3/UL (ref 0–0.4)
EOSINOPHIL NFR BLD AUTO: 2 %
ERYTHROCYTE [DISTWIDTH] IN BLOOD BY AUTOMATED COUNT: 13.9 % (ref 11.7–15.4)
GLOBULIN SER CALC-MCNC: 3.3 G/DL (ref 1.5–4.5)
GLUCOSE SERPL-MCNC: 92 MG/DL (ref 65–99)
HBA1C MFR BLD: 6.2 % (ref 4.8–5.6)
HCT VFR BLD AUTO: 40 % (ref 34–46.6)
HDLC SERPL-MCNC: 61 MG/DL
HGB BLD-MCNC: 12.6 G/DL (ref 11.1–15.9)
IMM GRANULOCYTES # BLD AUTO: 0 X10E3/UL (ref 0–0.1)
IMM GRANULOCYTES NFR BLD AUTO: 0 %
LDLC SERPL CALC-MCNC: 112 MG/DL (ref 0–99)
LYMPHOCYTES # BLD AUTO: 2.3 X10E3/UL (ref 0.7–3.1)
LYMPHOCYTES NFR BLD AUTO: 33 %
MCH RBC QN AUTO: 25.2 PG (ref 26.6–33)
MCHC RBC AUTO-ENTMCNC: 31.5 G/DL (ref 31.5–35.7)
MCV RBC AUTO: 80 FL (ref 79–97)
MONOCYTES # BLD AUTO: 0.4 X10E3/UL (ref 0.1–0.9)
MONOCYTES NFR BLD AUTO: 6 %
NEUTROPHILS # BLD AUTO: 4 X10E3/UL (ref 1.4–7)
NEUTROPHILS NFR BLD AUTO: 58 %
PLATELET # BLD AUTO: 532 X10E3/UL (ref 150–450)
POTASSIUM SERPL-SCNC: 4.5 MMOL/L (ref 3.5–5.2)
PROT SERPL-MCNC: 7.9 G/DL (ref 6–8.5)
RBC # BLD AUTO: 5 X10E6/UL (ref 3.77–5.28)
SODIUM SERPL-SCNC: 140 MMOL/L (ref 134–144)
TRIGL SERPL-MCNC: 112 MG/DL (ref 0–149)
TSH SERPL DL<=0.005 MIU/L-ACNC: 1.51 UIU/ML (ref 0.45–4.5)
VLDLC SERPL CALC-MCNC: 20 MG/DL (ref 5–40)
WBC # BLD AUTO: 6.9 X10E3/UL (ref 3.4–10.8)

## 2022-06-05 DIAGNOSIS — D75.839 THROMBOCYTOSIS: Primary | ICD-10-CM

## 2022-06-07 LAB
CYTOLOGIST CVX/VAG CYTO: ABNORMAL
CYTOLOGY CVX/VAG DOC CYTO: ABNORMAL
CYTOLOGY CVX/VAG DOC THIN PREP: ABNORMAL
DX ICD CODE: ABNORMAL
HIV 1 & 2 AB SER-IMP: ABNORMAL
HPV I/H RISK 4 DNA CVX QL PROBE+SIG AMP: POSITIVE
OTHER STN SPEC: ABNORMAL
STAT OF ADQ CVX/VAG CYTO-IMP: ABNORMAL

## 2022-06-07 NOTE — PROGRESS NOTES
Spoke with patient and explained that Pap smear is normal.  However HPV is positive.  That means that retesting in 1 year is recommended.  Patient verbalized understanding

## 2022-12-13 ENCOUNTER — OFFICE VISIT (OUTPATIENT)
Dept: FAMILY MEDICINE CLINIC | Facility: CLINIC | Age: 42
End: 2022-12-13

## 2022-12-13 VITALS
SYSTOLIC BLOOD PRESSURE: 118 MMHG | WEIGHT: 288.4 LBS | HEART RATE: 77 BPM | TEMPERATURE: 97.8 F | BODY MASS INDEX: 41.29 KG/M2 | DIASTOLIC BLOOD PRESSURE: 74 MMHG | HEIGHT: 70 IN | OXYGEN SATURATION: 98 %

## 2022-12-13 DIAGNOSIS — J40 BRONCHITIS: ICD-10-CM

## 2022-12-13 DIAGNOSIS — R05.1 ACUTE COUGH: Primary | ICD-10-CM

## 2022-12-13 PROCEDURE — 99214 OFFICE O/P EST MOD 30 MIN: CPT | Performed by: FAMILY MEDICINE

## 2022-12-13 RX ORDER — AZITHROMYCIN 250 MG/1
TABLET, FILM COATED ORAL
Qty: 6 TABLET | Refills: 0 | Status: SHIPPED | OUTPATIENT
Start: 2022-12-13

## 2022-12-13 RX ORDER — PROMETHAZINE HYDROCHLORIDE AND CODEINE PHOSPHATE 6.25; 1 MG/5ML; MG/5ML
5 SYRUP ORAL NIGHTLY
Qty: 118 ML | Refills: 0 | Status: SHIPPED | OUTPATIENT
Start: 2022-12-13

## 2022-12-13 RX ORDER — METHYLPREDNISOLONE 4 MG/1
TABLET ORAL
Qty: 21 EACH | Refills: 0 | Status: SHIPPED | OUTPATIENT
Start: 2022-12-13

## 2022-12-13 NOTE — PROGRESS NOTES
"Subjective   Aroldo Adams is a 42 y.o. female.     Chief Complaint   Patient presents with   • Cough     Has had for over 2 weeks and cant get rid of        History of Present Illness   Sick for 2 weeks with cough.  She did not test for COVID flu or RSV when she got sick first time 2 weeks ago but the cough is not getting any better.  She feels \"okay\" no other COVID or flu symptoms only cough and especially at night    The following portions of the patient's history were reviewed and updated as appropriate: allergies, current medications, past family history, past medical history, past social history, past surgical history and problem list.    Past Medical History:   Diagnosis Date   • Abnormal mammogram    • Acute upper respiratory infection    • Anemia    • Anxiety    • Arthritis    • Asthma    • BMI 40.0-44.9, adult ([facility])    • Breast mass, right    • Cervical radiculopathy    • Cutaneous candidiasis    • Eczema    • Fatigue    • Former smoker, stopped smoking in distant past    • Increased urinary frequency    • Mass of multiple sites of left breast    • Multiple joint pain    • Myalgia    • Myositis    • Rash    • Sleep apnea    • Trochanteric bursitis of right hip    • UTI symptoms    • Vitamin D deficiency        Past Surgical History:   Procedure Laterality Date   • GASTRIC BYPASS      for morbid obesity        Family History   Problem Relation Age of Onset   • Anemia Mother    • Arthritis Mother    • Breast cancer Mother    • Osteoporosis Mother    • Heart disease Mother         Has aortic stnosis dues to congential defect of shape of valve   • Thyroid disease Mother    • Alcohol abuse Father    • Hyperlipidemia Father    • Hypertension Father    • Diabetes Father    • Heart disease Father          at 66   • Bipolar disorder Sister    • Seizures Sister    • Alcohol abuse Maternal Grandmother    • Arthritis Maternal Grandmother    • Osteoporosis Maternal Grandmother    • Diabetes Paternal Grandmother "        Social History     Socioeconomic History   • Marital status:    Tobacco Use   • Smoking status: Former     Packs/day: 1.00     Years: 5.00     Pack years: 5.00     Types: Cigarettes     Quit date: 2000     Years since quittin.9   • Smokeless tobacco: Never   Substance and Sexual Activity   • Alcohol use: Never   • Drug use: Never   • Sexual activity: Yes     Partners: Male     Birth control/protection: None, Same-sex partner       Current Outpatient Medications on File Prior to Visit   Medication Sig Dispense Refill   • albuterol (ACCUNEB) 1.25 MG/3ML nebulizer solution Take 3 mL by nebulization Every 6 (Six) Hours As Needed for Wheezing. 60 each 12   • albuterol sulfate  (90 Base) MCG/ACT inhaler Inhale 2 puffs Every 4 (Four) Hours As Needed for Wheezing or Shortness of Air. 54 g 11   • cetirizine (ZyrTEC Allergy) 10 MG tablet Take 1 tablet by mouth Daily. 30 tablet 11   • montelukast (SINGULAIR) 10 MG tablet TAKE 1 TABLET BY MOUTH EVERY DAY 90 tablet 3     No current facility-administered medications on file prior to visit.       Review of Systems   Respiratory: Positive for cough. Negative for shortness of breath.        Recent Results (from the past 4704 hour(s))   Comprehensive Metabolic Panel    Collection Time: 22 12:02 PM    Specimen: Blood   Result Value Ref Range    Glucose 92 65 - 99 mg/dL    BUN 13 6 - 24 mg/dL    Creatinine 0.87 0.57 - 1.00 mg/dL    EGFR Result 84 >59 mL/min/1.73    BUN/Creatinine Ratio 15 9 - 23    Sodium 140 134 - 144 mmol/L    Potassium 4.5 3.5 - 5.2 mmol/L    Chloride 101 96 - 106 mmol/L    Total CO2 22 20 - 29 mmol/L    Calcium 9.9 8.7 - 10.2 mg/dL    Total Protein 7.9 6.0 - 8.5 g/dL    Albumin 4.6 3.8 - 4.8 g/dL    Globulin 3.3 1.5 - 4.5 g/dL    A/G Ratio 1.4 1.2 - 2.2    Total Bilirubin 0.5 0.0 - 1.2 mg/dL    Alkaline Phosphatase 63 44 - 121 IU/L    AST (SGOT) 15 0 - 40 IU/L    ALT (SGPT) 11 0 - 32 IU/L   Lipid Panel    Collection Time:  "06/03/22 12:02 PM    Specimen: Blood   Result Value Ref Range    Total Cholesterol 193 100 - 199 mg/dL    Triglycerides 112 0 - 149 mg/dL    HDL Cholesterol 61 >39 mg/dL    VLDL Cholesterol Keyur 20 5 - 40 mg/dL    LDL Chol Calc (NIH) 112 (H) 0 - 99 mg/dL   CBC & Differential    Collection Time: 06/03/22 12:02 PM    Specimen: Blood   Result Value Ref Range    WBC 6.9 3.4 - 10.8 x10E3/uL    RBC 5.00 3.77 - 5.28 x10E6/uL    Hemoglobin 12.6 11.1 - 15.9 g/dL    Hematocrit 40.0 34.0 - 46.6 %    MCV 80 79 - 97 fL    MCH 25.2 (L) 26.6 - 33.0 pg    MCHC 31.5 31.5 - 35.7 g/dL    RDW 13.9 11.7 - 15.4 %    Platelets 532 (H) 150 - 450 x10E3/uL    Neutrophil Rel % 58 Not Estab. %    Lymphocyte Rel % 33 Not Estab. %    Monocyte Rel % 6 Not Estab. %    Eosinophil Rel % 2 Not Estab. %    Basophil Rel % 1 Not Estab. %    Neutrophils Absolute 4.0 1.4 - 7.0 x10E3/uL    Lymphocytes Absolute 2.3 0.7 - 3.1 x10E3/uL    Monocytes Absolute 0.4 0.1 - 0.9 x10E3/uL    Eosinophils Absolute 0.2 0.0 - 0.4 x10E3/uL    Basophils Absolute 0.1 0.0 - 0.2 x10E3/uL    Immature Granulocyte Rel % 0 Not Estab. %    Immature Grans Absolute 0.0 0.0 - 0.1 x10E3/uL   TSH    Collection Time: 06/03/22 12:02 PM    Specimen: Blood   Result Value Ref Range    TSH 1.510 0.450 - 4.500 uIU/mL   Hemoglobin A1c    Collection Time: 06/03/22 12:02 PM    Specimen: Blood   Result Value Ref Range    Hemoglobin A1C 6.2 (H) 4.8 - 5.6 %   IgP, Aptima HPV    Collection Time: 06/03/22  1:29 PM    Specimen: Cervix; ThinPrep Vial   Result Value Ref Range    Diagnosis Comment     Specimen adequacy: Comment     Clinician Provided ICD-10: Comment     Performed by: Comment     . .     Note: Comment     Method: Comment     HPV Aptima Positive (A) Negative     Objective   Vitals:    12/13/22 1405   BP: 118/74   BP Location: Right arm   Patient Position: Sitting   Pulse: 77   Temp: 97.8 °F (36.6 °C)   SpO2: 98%   Weight: 131 kg (288 lb 6.4 oz)   Height: 177.8 cm (70\")     Body mass index is " 41.38 kg/m².  Physical Exam  Vitals and nursing note reviewed.   Constitutional:       General: She is not in acute distress.     Appearance: She is well-developed. She is not diaphoretic.   Cardiovascular:      Rate and Rhythm: Normal rate and regular rhythm.   Pulmonary:      Effort: Pulmonary effort is normal. No respiratory distress.      Breath sounds: Rhonchi present. No wheezing.           Diagnoses and all orders for this visit:    1. Acute cough (Primary)  -     azithromycin (Zithromax Z-Leo) 250 MG tablet; Take 2 tablets by mouth on day 1, then 1 tablet daily on days 2-5  Dispense: 6 tablet; Refill: 0  -     methylPREDNISolone (MEDROL) 4 MG dose pack; Take as directed on package instructions.  Dispense: 21 each; Refill: 0  -     promethazine-codeine (PHENERGAN with CODEINE) 6.25-10 MG/5ML syrup; Take 5 mL by mouth Every Night.  Dispense: 118 mL; Refill: 0    2. Bronchitis  -     azithromycin (Zithromax Z-Leo) 250 MG tablet; Take 2 tablets by mouth on day 1, then 1 tablet daily on days 2-5  Dispense: 6 tablet; Refill: 0  -     methylPREDNISolone (MEDROL) 4 MG dose pack; Take as directed on package instructions.  Dispense: 21 each; Refill: 0  -     promethazine-codeine (PHENERGAN with CODEINE) 6.25-10 MG/5ML syrup; Take 5 mL by mouth Every Night.  Dispense: 118 mL; Refill: 0      Return if symptoms worsen or fail to improve.

## 2022-12-15 DIAGNOSIS — G47.33 OSA (OBSTRUCTIVE SLEEP APNEA): Primary | ICD-10-CM

## 2022-12-15 DIAGNOSIS — R05.1 ACUTE COUGH: ICD-10-CM

## 2022-12-15 DIAGNOSIS — J40 BRONCHITIS: ICD-10-CM

## 2022-12-15 RX ORDER — BROMPHENIRAMINE MALEATE, PSEUDOEPHEDRINE HYDROCHLORIDE, AND DEXTROMETHORPHAN HYDROBROMIDE 2; 30; 10 MG/5ML; MG/5ML; MG/5ML
5 SYRUP ORAL NIGHTLY
Qty: 118 ML | Refills: 2 | Status: SHIPPED | OUTPATIENT
Start: 2022-12-15

## 2023-03-02 ENCOUNTER — TELEPHONE (OUTPATIENT)
Dept: FAMILY MEDICINE CLINIC | Facility: CLINIC | Age: 43
End: 2023-03-02

## 2023-03-02 DIAGNOSIS — J45.909 MILD ASTHMA, UNSPECIFIED WHETHER COMPLICATED, UNSPECIFIED WHETHER PERSISTENT: ICD-10-CM

## 2023-03-02 RX ORDER — MONTELUKAST SODIUM 10 MG/1
10 TABLET ORAL DAILY
Qty: 90 TABLET | Refills: 0 | Status: SHIPPED | OUTPATIENT
Start: 2023-03-02

## 2023-03-02 NOTE — TELEPHONE ENCOUNTER
lov  12/13/2022    Nov none     lr 2/28/2022    PATIENT WILL NEED TO LOOK FOR A NEW PROVIDER FOR FURTHER MEDICATIONS     LMTCB

## 2023-03-02 NOTE — TELEPHONE ENCOUNTER
Caller: Aroldo Adams    Relationship to patient: Self    Best call back number: [phone]    Patient is needing: PATIENT IS CALLING TO CHECK THE STATUS OF HER REQUEST FOR A NEW PRESCRIPTION WITH REFILLS FOR THE FOLLOWING PHARMACY.  SHE STATES THE PHARMACY TOLD HER THEY HAVE NOT RECEIVED.     montelukast (SINGULAIR) 10 MG tablet     [facility]/pharmacy #[id number] - [address] - [phone]  - [phone]   [phone]    PLEASE ADVISE.

## 2023-03-02 NOTE — TELEPHONE ENCOUNTER
OK FOR HUB TO READ    LMTCB     MEDICATION WAS SENT TO  A PROVIDER FOR A 30 DAY SUPPLY. PATIENT WILL NEED TO FIND A NEW PROVIDER FOR FURTHER REFILLS ON MEDICATIONS

## 2023-05-24 ENCOUNTER — OFFICE VISIT (OUTPATIENT)
Dept: FAMILY MEDICINE CLINIC | Facility: CLINIC | Age: 43
End: 2023-05-24
Payer: COMMERCIAL

## 2023-05-24 VITALS
WEIGHT: 288.7 LBS | SYSTOLIC BLOOD PRESSURE: 128 MMHG | HEIGHT: 70 IN | HEART RATE: 86 BPM | DIASTOLIC BLOOD PRESSURE: 81 MMHG | OXYGEN SATURATION: 95 % | BODY MASS INDEX: 41.33 KG/M2

## 2023-05-24 DIAGNOSIS — J45.909 MILD ASTHMA, UNSPECIFIED WHETHER COMPLICATED, UNSPECIFIED WHETHER PERSISTENT: Primary | ICD-10-CM

## 2023-05-24 DIAGNOSIS — Z00.00 ANNUAL PHYSICAL EXAM: ICD-10-CM

## 2023-05-24 RX ORDER — TRIAMCINOLONE ACETONIDE 1 MG/G
CREAM TOPICAL
COMMUNITY
Start: 2023-05-15

## 2023-05-24 RX ORDER — MONTELUKAST SODIUM 10 MG/1
10 TABLET ORAL DAILY
Qty: 90 TABLET | Refills: 3 | Status: SHIPPED | OUTPATIENT
Start: 2023-05-24

## 2023-05-24 RX ORDER — CETIRIZINE HYDROCHLORIDE 10 MG/1
10 TABLET ORAL DAILY
COMMUNITY

## 2023-05-24 RX ORDER — ALBUTEROL SULFATE 90 UG/1
2 AEROSOL, METERED RESPIRATORY (INHALATION) EVERY 4 HOURS PRN
Qty: 54 G | Refills: 11 | Status: SHIPPED | OUTPATIENT
Start: 2023-05-24

## 2023-05-24 NOTE — PROGRESS NOTES
"Chief Complaint  Med Refill (New patient, med refill Singular, inhaler)    Subjective        Aroldo Adams presents to [facility]  History of Present Illness  Transfer of care from [facility].  Needs her medications filled for the asthma. She feels she is well controlled on her inhalers.     I have reviewed the patient's medical history in detail and updated the computerized patient record.      Current Outpatient Medications:   •  albuterol (ACCUNEB) 1.25 MG/3ML nebulizer solution, Take 3 mL by nebulization Every 6 (Six) Hours As Needed for Wheezing., Disp: 60 each, Rfl: 12  •  albuterol sulfate  (90 Base) MCG/ACT inhaler, Inhale 2 puffs Every 4 (Four) Hours As Needed for Wheezing or Shortness of Air., Disp: 54 g, Rfl: 11  •  cetirizine (zyrTEC) 10 MG tablet, Take 1 tablet by mouth Daily., Disp: , Rfl:   •  montelukast (SINGULAIR) 10 MG tablet, Take 1 tablet by mouth Daily., Disp: 90 tablet, Rfl: 3  •  triamcinolone (KENALOG) 0.1 % cream, , Disp: , Rfl:      Objective   Vital Signs:  /81 (BP Location: Left arm, Patient Position: Sitting, Cuff Size: Adult)   Pulse 86   Ht 177.8 cm (70\")   Wt 131 kg (288 lb 11.2 oz)   SpO2 95%   BMI 41.42 kg/m²   Estimated body mass index is 41.42 kg/m² as calculated from the following:    Height as of this encounter: 177.8 cm (70\").    Weight as of this encounter: 131 kg (288 lb 11.2 oz).             Physical Exam  Vitals reviewed.   Constitutional:       Appearance: Normal appearance.   Cardiovascular:      Rate and Rhythm: Normal rate and regular rhythm.      Pulses: Normal pulses.      Heart sounds: Normal heart sounds.   Pulmonary:      Effort: Pulmonary effort is normal.      Breath sounds: Normal breath sounds.   Skin:     General: Skin is warm and dry.   Neurological:      Mental Status: She is oriented to person, place, and time.   Psychiatric:      Comments: No acute distress        Result Review :                 "   Assessment and Plan   Diagnoses and all orders for this visit:    1. Mild asthma, unspecified whether complicated, unspecified whether persistent  -     albuterol sulfate  (90 Base) MCG/ACT inhaler; Inhale 2 puffs Every 4 (Four) Hours As Needed for Wheezing or Shortness of Air.  Dispense: 54 g; Refill: 11  -     montelukast (SINGULAIR) 10 MG tablet; Take 1 tablet by mouth Daily.  Dispense: 90 tablet; Refill: 3    Ms. Adams presents today to establish care.  I have reviewed her medical history with her.  I have refilled her medications for her today for her asthma.          Follow Up   Return in about 6 weeks (around 7/5/2023), or if symptoms worsen or fail to improve, for Annual physical, Fasting labs 1 week prior to next f/u.  Patient was given instructions and counseling regarding her condition or for health maintenance advice. Please see specific information pulled into the AVS if appropriate.

## 2023-05-25 ENCOUNTER — PATIENT ROUNDING (BHMG ONLY) (OUTPATIENT)
Dept: FAMILY MEDICINE CLINIC | Facility: CLINIC | Age: 43
End: 2023-05-25
Payer: COMMERCIAL

## 2023-10-13 PROBLEM — Z12.11 ENCOUNTER FOR SCREENING FOR MALIGNANT NEOPLASM OF COLON: Status: ACTIVE | Noted: 2023-07-04

## 2024-05-24 DIAGNOSIS — J45.909 MILD ASTHMA, UNSPECIFIED WHETHER COMPLICATED, UNSPECIFIED WHETHER PERSISTENT: ICD-10-CM

## 2024-05-24 RX ORDER — MONTELUKAST SODIUM 10 MG/1
10 TABLET ORAL DAILY
Qty: 90 TABLET | Refills: 3 | Status: SHIPPED | OUTPATIENT
Start: 2024-05-24

## 2024-07-30 DIAGNOSIS — Z00.00 ANNUAL PHYSICAL EXAM: Primary | ICD-10-CM

## 2024-07-30 DIAGNOSIS — Z13.220 SCREENING FOR HYPERLIPIDEMIA: ICD-10-CM

## 2024-07-30 DIAGNOSIS — Z13.1 SCREENING FOR DIABETES MELLITUS (DM): ICD-10-CM

## 2024-07-30 DIAGNOSIS — Z13.0 SCREENING FOR DEFICIENCY ANEMIA: ICD-10-CM

## 2024-08-03 LAB
ALBUMIN SERPL-MCNC: 4.2 G/DL (ref 3.9–4.9)
ALP SERPL-CCNC: 61 IU/L (ref 44–121)
ALT SERPL-CCNC: 11 IU/L (ref 0–32)
AST SERPL-CCNC: 15 IU/L (ref 0–40)
BILIRUB SERPL-MCNC: 0.5 MG/DL (ref 0–1.2)
BUN SERPL-MCNC: 14 MG/DL (ref 6–24)
BUN/CREAT SERPL: 18 (ref 9–23)
CALCIUM SERPL-MCNC: 9.5 MG/DL (ref 8.7–10.2)
CHLORIDE SERPL-SCNC: 102 MMOL/L (ref 96–106)
CHOLEST SERPL-MCNC: 183 MG/DL (ref 100–199)
CHOLEST/HDLC SERPL: 2.8 RATIO (ref 0–4.4)
CO2 SERPL-SCNC: 24 MMOL/L (ref 20–29)
CREAT SERPL-MCNC: 0.8 MG/DL (ref 0.57–1)
EGFRCR SERPLBLD CKD-EPI 2021: 92 ML/MIN/1.73
ERYTHROCYTE [DISTWIDTH] IN BLOOD BY AUTOMATED COUNT: 14.2 % (ref 11.7–15.4)
GLOBULIN SER CALC-MCNC: 3 G/DL (ref 1.5–4.5)
GLUCOSE SERPL-MCNC: 96 MG/DL (ref 70–99)
HBA1C MFR BLD: 5.6 % (ref 4.8–5.6)
HCT VFR BLD AUTO: 38.7 % (ref 34–46.6)
HDLC SERPL-MCNC: 65 MG/DL
HGB BLD-MCNC: 11.7 G/DL (ref 11.1–15.9)
LDLC SERPL CALC-MCNC: 103 MG/DL (ref 0–99)
MCH RBC QN AUTO: 25.1 PG (ref 26.6–33)
MCHC RBC AUTO-ENTMCNC: 30.2 G/DL (ref 31.5–35.7)
MCV RBC AUTO: 83 FL (ref 79–97)
PLATELET # BLD AUTO: 451 X10E3/UL (ref 150–450)
POTASSIUM SERPL-SCNC: 4.6 MMOL/L (ref 3.5–5.2)
PROT SERPL-MCNC: 7.2 G/DL (ref 6–8.5)
RBC # BLD AUTO: 4.67 X10E6/UL (ref 3.77–5.28)
SODIUM SERPL-SCNC: 138 MMOL/L (ref 134–144)
TRIGL SERPL-MCNC: 82 MG/DL (ref 0–149)
VLDLC SERPL CALC-MCNC: 15 MG/DL (ref 5–40)
WBC # BLD AUTO: 6 X10E3/UL (ref 3.4–10.8)

## 2024-08-09 ENCOUNTER — OFFICE VISIT (OUTPATIENT)
Dept: FAMILY MEDICINE CLINIC | Facility: CLINIC | Age: 44
End: 2024-08-09
Payer: COMMERCIAL

## 2024-08-09 VITALS
BODY MASS INDEX: 40.01 KG/M2 | DIASTOLIC BLOOD PRESSURE: 92 MMHG | WEIGHT: 279.5 LBS | HEART RATE: 83 BPM | OXYGEN SATURATION: 99 % | SYSTOLIC BLOOD PRESSURE: 144 MMHG | HEIGHT: 70 IN

## 2024-08-09 DIAGNOSIS — Z00.00 ANNUAL PHYSICAL EXAM: Primary | ICD-10-CM

## 2024-08-09 DIAGNOSIS — Z12.11 SCREENING FOR COLON CANCER: ICD-10-CM

## 2024-08-09 PROCEDURE — 99396 PREV VISIT EST AGE 40-64: CPT | Performed by: NURSE PRACTITIONER

## 2024-08-09 NOTE — PROGRESS NOTES
"Chief Complaint  Annual Exam (Annual physical, discuss labs)    Subjective        Aroldo Adams presents to [facility]  History of Present Illness  Ms. Green presents today for an annual physical exam with lab review.       I have reviewed the patient's medical history in detail and updated the computerized patient record.       Current Outpatient Medications:     albuterol (ACCUNEB) 1.25 MG/3ML nebulizer solution, Take 3 mL by nebulization Every 6 (Six) Hours As Needed for Wheezing., Disp: 60 each, Rfl: 12    albuterol sulfate  (90 Base) MCG/ACT inhaler, Inhale 2 puffs Every 4 (Four) Hours As Needed for Wheezing or Shortness of Air., Disp: 54 g, Rfl: 11    cetirizine (zyrTEC) 10 MG tablet, Take 1 tablet by mouth Daily., Disp: , Rfl:     montelukast (SINGULAIR) 10 MG tablet, TAKE 1 TABLET BY MOUTH EVERY DAY, Disp: 90 tablet, Rfl: 3       Review of Systems   Constitutional: Negative.    HENT: Negative.     Eyes: Negative.    Respiratory: Negative.     Cardiovascular: Negative.    Gastrointestinal: Negative.    Genitourinary: Negative.    Musculoskeletal:  Positive for arthralgias.   Skin: Negative.    Neurological: Negative.    Psychiatric/Behavioral: Negative.        Objective   Vital Signs:  /92 (BP Location: Left arm, Patient Position: Sitting, Cuff Size: Adult)   Pulse 83   Ht 177.8 cm (70\")   Wt 127 kg (279 lb 8 oz)   SpO2 99%   BMI 40.10 kg/m²   Estimated body mass index is 40.1 kg/m² as calculated from the following:    Height as of this encounter: 177.8 cm (70\").    Weight as of this encounter: 127 kg (279 lb 8 oz).             Physical Exam  Vitals reviewed.   Constitutional:       Appearance: Normal appearance. She is obese.   Neck:      Vascular: No carotid bruit.   Cardiovascular:      Rate and Rhythm: Normal rate and regular rhythm.      Pulses: Normal pulses.      Heart sounds: Normal heart sounds.   Pulmonary:      Effort: Pulmonary effort is " normal.      Breath sounds: Normal breath sounds.   Abdominal:      General: Bowel sounds are normal.      Palpations: Abdomen is soft.   Musculoskeletal:         General: Normal range of motion.      Cervical back: Normal range of motion.   Skin:     General: Skin is warm and dry.   Neurological:      Mental Status: She is alert and oriented to person, place, and time.   Psychiatric:      Comments: No acute  distress        Result Review :      Common labs          8/2/2024    08:48   Common Labs   Glucose 96    BUN 14    Creatinine 0.80    Sodium 138    Potassium 4.6    Chloride 102    Calcium 9.5    Total Protein 7.2    Albumin 4.2    Total Bilirubin 0.5    Alkaline Phosphatase 61    AST (SGOT) 15    ALT (SGPT) 11    WBC 6.0    Hemoglobin 11.7    Hematocrit 38.7    Platelets 451    Total Cholesterol 183    Triglycerides 82    HDL Cholesterol 65    LDL Cholesterol  103    Hemoglobin A1C 5.6                   Assessment and Plan     Diagnoses and all orders for this visit:    1. Annual physical exam (Primary)    2. Screening for colon cancer  -     Cologuard - Stool, Per Rectum; Future    Ms. Green appears to be doing well today.  Her physical exam is unremarkable.   I have reviewed her labs with her today.  Her labs are essentially at goal.  We have discussed age related healthy behaviors.   I have ordered Cologuard for colon cancer screening.        Follow Up     Return in about 1 year (around 8/9/2025), or if symptoms worsen or fail to improve, for Annual physical, Fasting labs 1 week prior to next f/u.  Patient was given instructions and counseling regarding her condition or for health maintenance advice. Please see specific information pulled into the AVS if appropriate.

## 2024-08-23 ENCOUNTER — TELEPHONE (OUTPATIENT)
Dept: FAMILY MEDICINE CLINIC | Facility: CLINIC | Age: 44
End: 2024-08-23
Payer: COMMERCIAL

## 2024-10-01 DIAGNOSIS — J45.909 MILD ASTHMA, UNSPECIFIED WHETHER COMPLICATED, UNSPECIFIED WHETHER PERSISTENT: ICD-10-CM

## 2024-10-01 RX ORDER — ALBUTEROL SULFATE 90 UG/1
2 INHALANT RESPIRATORY (INHALATION) EVERY 4 HOURS PRN
Qty: 18 G | Refills: 11 | Status: SHIPPED | OUTPATIENT
Start: 2024-10-01

## 2024-10-01 RX ORDER — MONTELUKAST SODIUM 10 MG/1
10 TABLET ORAL DAILY
Qty: 90 TABLET | Refills: 3 | Status: SHIPPED | OUTPATIENT
Start: 2024-10-01

## 2024-10-01 NOTE — TELEPHONE ENCOUNTER
Caller: Aroldo Adams    Relationship: Self    Best call back number: [phone]     Requested Prescriptions:   Requested Prescriptions     Pending Prescriptions Disp Refills    montelukast (SINGULAIR) 10 MG tablet 90 tablet 3     Sig: Take 1 tablet by mouth Daily.        Pharmacy where request should be sent: [facility]/PHARMACY #[id number] [address] AT [facility] - [phone]  - [phone] FX     Last office visit with prescribing clinician: 8/9/2024   Last telemedicine visit with prescribing clinician: Visit date not found   Next office visit with prescribing clinician: 8/15/2025       Would you like a call back once the refill request has been completed: [] Yes [x] No    If the office needs to give you a call back, can they leave a voicemail: [] Yes [x] No    Wilma Larios Rep   10/01/24 14:42 EDT

## 2025-02-04 ENCOUNTER — OFFICE VISIT (OUTPATIENT)
Dept: FAMILY MEDICINE CLINIC | Facility: CLINIC | Age: 45
End: 2025-02-04
Payer: COMMERCIAL

## 2025-02-04 VITALS
WEIGHT: 280 LBS | BODY MASS INDEX: 40.09 KG/M2 | TEMPERATURE: 98.4 F | SYSTOLIC BLOOD PRESSURE: 122 MMHG | OXYGEN SATURATION: 98 % | DIASTOLIC BLOOD PRESSURE: 82 MMHG | HEIGHT: 70 IN | HEART RATE: 72 BPM

## 2025-02-04 DIAGNOSIS — E66.09 CLASS 2 OBESITY DUE TO EXCESS CALORIES WITHOUT SERIOUS COMORBIDITY WITH BODY MASS INDEX (BMI) OF 36.0 TO 36.9 IN ADULT: Primary | ICD-10-CM

## 2025-02-04 DIAGNOSIS — E66.812 CLASS 2 OBESITY DUE TO EXCESS CALORIES WITHOUT SERIOUS COMORBIDITY WITH BODY MASS INDEX (BMI) OF 36.0 TO 36.9 IN ADULT: Primary | ICD-10-CM

## 2025-02-04 PROCEDURE — 99214 OFFICE O/P EST MOD 30 MIN: CPT | Performed by: NURSE PRACTITIONER

## 2025-02-04 NOTE — PROGRESS NOTES
"Chief Complaint  Weight Loss (Patient looking into Zepbound)    Subjective        Aroldo Adams presents to [facility]  Obesity  Today's concern : obesity.   Symptoms are chronic.   Onset was at an unknown time.   Symptoms occur constantly.   Symptoms include joint pain, fatigue, joint swelling and myalgias.    Pertinent negative symptoms include no chest pain.   Other symptom: becomes winded with heavy activity, she snors.   Aggravating factors include stress.   Treatments tried: lap band, weight loss, phentermine, being active.   Improvement on treatment was moderate.       I have reviewed the patient's medical history in detail and updated the computerized patient record.       Current Outpatient Medications:     albuterol (ACCUNEB) 1.25 MG/3ML nebulizer solution, Take 3 mL by nebulization Every 6 (Six) Hours As Needed for Wheezing., Disp: 60 each, Rfl: 12    albuterol sulfate  (90 Base) MCG/ACT inhaler, INHALE 2 PUFFS EVERY 4 HOURS AS NEEDED FOR WHEEZING OR SHORTNESS OF AIR, Disp: 18 g, Rfl: 11    cetirizine (zyrTEC) 10 MG tablet, Take 1 tablet by mouth Daily., Disp: , Rfl:     montelukast (SINGULAIR) 10 MG tablet, Take 1 tablet by mouth Daily., Disp: 90 tablet, Rfl: 3    Tirzepatide-Weight Management (ZEPBOUND) 2.5 MG/0.5ML solution auto-injector, Inject 0.5 mL under the skin into the appropriate area as directed 1 (One) Time Per Week., Disp: 2 mL, Rfl: 0       Objective   Vital Signs:  /82 (BP Location: Left arm, Patient Position: Sitting, Cuff Size: Adult)   Pulse 72   Temp 98.4 °F (36.9 °C) (Temporal)   Ht 177.8 cm (70\")   Wt 127 kg (280 lb)   SpO2 98%   BMI 40.18 kg/m²   Estimated body mass index is 40.18 kg/m² as calculated from the following:    Height as of this encounter: 177.8 cm (70\").    Weight as of this encounter: 127 kg (280 lb).    Class 2 Severe Obesity (BMI >=35 and <=39.9). Obesity-related health conditions include the following:  asthma . " Obesity is worsening. BMI is is above average; BMI management plan is completed. We discussed portion control, increasing exercise, and pharmacologic options including Zepbound .      Physical Exam  Vitals reviewed.   Constitutional:       Appearance: She is obese.   Cardiovascular:      Rate and Rhythm: Regular rhythm.      Pulses: Normal pulses.      Heart sounds: Normal heart sounds.   Pulmonary:      Effort: Pulmonary effort is normal.      Breath sounds: Normal breath sounds.   Skin:     General: Skin is warm and dry.   Neurological:      Mental Status: She is alert and oriented to person, place, and time.   Psychiatric:      Comments: No acute distress        Result Review :                Assessment and Plan   Diagnoses and all orders for this visit:    1. Class 2 obesity due to excess calories without serious comorbidity with body mass index (BMI) of 36.0 to 36.9 in adult (Primary)  -     Tirzepatide-Weight Management (ZEPBOUND) 2.5 MG/0.5ML solution auto-injector; Inject 0.5 mL under the skin into the appropriate area as directed 1 (One) Time Per Week.  Dispense: 2 mL; Refill: 0    Ms. Angie appears to be doing well today.  We have discussed her obesity symptoms and the things she has done in the past to loose weight(phentermine, lab band placement, exercise).  We have discussed pharmaceutical weight loss options.          Follow Up   Return if symptoms worsen or fail to improve, for Next scheduled follow up.  Patient was given instructions and counseling regarding her condition or for health maintenance advice. Please see specific information pulled into the AVS if appropriate.

## 2025-04-09 ENCOUNTER — TELEPHONE (OUTPATIENT)
Dept: FAMILY MEDICINE CLINIC | Facility: CLINIC | Age: 45
End: 2025-04-09

## 2025-04-09 NOTE — TELEPHONE ENCOUNTER
Caller: Aroldo Adams    Relationship: Self    Best call back number:     What does billing need from the patient: PATIENT IS CALLING IN TO ASK IF HER VISIT ON 02/04/25 FOR A WEIGHT LOSS CONSULTATION COULD BE BILLED DIFFERENTLY IN ORDER TO BE PAID BY HER INSURANCE. SHE HAS TALKED TO BILLING ABOUT THIS AND WAS TOLD TO CALL THE OFFICE ABOUT THIS.   SHE HAS RECEIVED A EXPLANATION OF BENEFITS AND ON ONE SECTION IT SAYS SHE IS TO PAY ANOTHER SECTION SAYS SHE WILL NOT BE RESPONSIBLE TO PAY.

## 2025-04-09 NOTE — TELEPHONE ENCOUNTER
I am not sure what the patient is talking about. It was an office visit to discuss management of a chronic condition and medication was prescribed. According to the billing template that is a 01889.

## 2025-04-14 NOTE — TELEPHONE ENCOUNTER
Please call patient and let her know that in this instance, it would be best to wait for an actual bill from the insurance as the EOB is just what is sent out while insurance is still being processed for the claim.

## 2025-04-15 ENCOUNTER — TELEPHONE (OUTPATIENT)
Dept: FAMILY MEDICINE CLINIC | Facility: CLINIC | Age: 45
End: 2025-04-15
Payer: COMMERCIAL

## 2025-04-15 NOTE — TELEPHONE ENCOUNTER
Spoke to patient and relayed msg from [facility] manager Ritu Stringer:  In this instance, it would be best to wait for an actual bill from the insurance as the EOB is just what is sent out while insurance is still being processed for the claim.  Patient said she would wait to see what she receives next.

## 2025-08-15 ENCOUNTER — OFFICE VISIT (OUTPATIENT)
Dept: FAMILY MEDICINE CLINIC | Facility: CLINIC | Age: 45
End: 2025-08-15
Payer: COMMERCIAL

## 2025-08-15 VITALS
DIASTOLIC BLOOD PRESSURE: 80 MMHG | HEART RATE: 70 BPM | BODY MASS INDEX: 38.78 KG/M2 | TEMPERATURE: 98.4 F | SYSTOLIC BLOOD PRESSURE: 122 MMHG | HEIGHT: 70 IN | OXYGEN SATURATION: 99 % | WEIGHT: 270.9 LBS

## 2025-08-15 DIAGNOSIS — Z00.00 ANNUAL PHYSICAL EXAM: Primary | ICD-10-CM

## 2025-08-15 PROCEDURE — 99396 PREV VISIT EST AGE 40-64: CPT | Performed by: NURSE PRACTITIONER

## 2025-08-16 LAB
ALBUMIN SERPL-MCNC: 4.2 G/DL (ref 3.9–4.9)
ALP SERPL-CCNC: 52 IU/L (ref 44–121)
ALT SERPL-CCNC: 15 IU/L (ref 0–32)
AST SERPL-CCNC: 14 IU/L (ref 0–40)
BILIRUB SERPL-MCNC: 0.5 MG/DL (ref 0–1.2)
BUN SERPL-MCNC: 12 MG/DL (ref 6–24)
BUN/CREAT SERPL: 16 (ref 9–23)
CALCIUM SERPL-MCNC: 9.4 MG/DL (ref 8.7–10.2)
CHLORIDE SERPL-SCNC: 102 MMOL/L (ref 96–106)
CHOLEST SERPL-MCNC: 186 MG/DL (ref 100–199)
CHOLEST/HDLC SERPL: 3 RATIO (ref 0–4.4)
CO2 SERPL-SCNC: 22 MMOL/L (ref 20–29)
CREAT SERPL-MCNC: 0.77 MG/DL (ref 0.57–1)
EGFRCR SERPLBLD CKD-EPI 2021: 96 ML/MIN/1.73
ERYTHROCYTE [DISTWIDTH] IN BLOOD BY AUTOMATED COUNT: 14.3 % (ref 11.7–15.4)
GLOBULIN SER CALC-MCNC: 2.9 G/DL (ref 1.5–4.5)
GLUCOSE SERPL-MCNC: 90 MG/DL (ref 70–99)
HBA1C MFR BLD: 5.8 % (ref 4.8–5.6)
HCT VFR BLD AUTO: 39.3 % (ref 34–46.6)
HDLC SERPL-MCNC: 62 MG/DL
HGB BLD-MCNC: 11.5 G/DL (ref 11.1–15.9)
LDLC SERPL CALC-MCNC: 109 MG/DL (ref 0–99)
MCH RBC QN AUTO: 24.2 PG (ref 26.6–33)
MCHC RBC AUTO-ENTMCNC: 29.3 G/DL (ref 31.5–35.7)
MCV RBC AUTO: 83 FL (ref 79–97)
PLATELET # BLD AUTO: 465 X10E3/UL (ref 150–450)
POTASSIUM SERPL-SCNC: 4.6 MMOL/L (ref 3.5–5.2)
PROT SERPL-MCNC: 7.1 G/DL (ref 6–8.5)
RBC # BLD AUTO: 4.76 X10E6/UL (ref 3.77–5.28)
SODIUM SERPL-SCNC: 138 MMOL/L (ref 134–144)
TRIGL SERPL-MCNC: 83 MG/DL (ref 0–149)
VLDLC SERPL CALC-MCNC: 15 MG/DL (ref 5–40)
WBC # BLD AUTO: 5.9 X10E3/UL (ref 3.4–10.8)